# Patient Record
Sex: FEMALE | Race: WHITE | NOT HISPANIC OR LATINO | Employment: OTHER | URBAN - METROPOLITAN AREA
[De-identification: names, ages, dates, MRNs, and addresses within clinical notes are randomized per-mention and may not be internally consistent; named-entity substitution may affect disease eponyms.]

---

## 2017-03-06 ENCOUNTER — HISTORICAL (OUTPATIENT)
Dept: RADIOLOGY | Facility: HOSPITAL | Age: 77
End: 2017-03-06

## 2018-06-07 ENCOUNTER — HISTORICAL (OUTPATIENT)
Dept: RADIOLOGY | Facility: HOSPITAL | Age: 78
End: 2018-06-07

## 2018-10-23 ENCOUNTER — HISTORICAL (OUTPATIENT)
Dept: ADMINISTRATIVE | Facility: HOSPITAL | Age: 78
End: 2018-10-23

## 2018-10-23 LAB
ABS NEUT (OLG): 2.22 X10(3)/MCL (ref 2.1–9.2)
ALBUMIN SERPL-MCNC: 3.8 GM/DL (ref 3.4–5)
ALBUMIN/GLOB SERPL: 1 RATIO (ref 1–2)
ALP SERPL-CCNC: 78 UNIT/L (ref 45–117)
ALT SERPL-CCNC: 30 UNIT/L (ref 12–78)
AST SERPL-CCNC: 21 UNIT/L (ref 15–37)
BASOPHILS # BLD AUTO: 0.06 X10(3)/MCL
BASOPHILS NFR BLD AUTO: 2 %
BILIRUB SERPL-MCNC: 0.8 MG/DL (ref 0.2–1)
BILIRUBIN DIRECT+TOT PNL SERPL-MCNC: 0.2 MG/DL
BILIRUBIN DIRECT+TOT PNL SERPL-MCNC: 0.6 MG/DL
BUN SERPL-MCNC: 13 MG/DL (ref 7–18)
CALCIUM SERPL-MCNC: 9 MG/DL (ref 8.5–10.1)
CHLORIDE SERPL-SCNC: 105 MMOL/L (ref 98–107)
CO2 SERPL-SCNC: 27 MMOL/L (ref 21–32)
CREAT SERPL-MCNC: 0.7 MG/DL (ref 0.6–1.3)
EOSINOPHIL # BLD AUTO: 0.16 10*3/UL
EOSINOPHIL NFR BLD AUTO: 4 %
ERYTHROCYTE [DISTWIDTH] IN BLOOD BY AUTOMATED COUNT: 12.7 % (ref 11.5–14.5)
GLOBULIN SER-MCNC: 3.8 GM/ML (ref 2.3–3.5)
GLUCOSE SERPL-MCNC: 82 MG/DL (ref 74–106)
HCT VFR BLD AUTO: 41.8 % (ref 35–46)
HGB BLD-MCNC: 13.6 GM/DL (ref 12–16)
IMM GRANULOCYTES # BLD AUTO: 0.01 10*3/UL
IMM GRANULOCYTES NFR BLD AUTO: 0 %
LYMPHOCYTES # BLD AUTO: 1.01 X10(3)/MCL
LYMPHOCYTES NFR BLD AUTO: 26 % (ref 13–40)
MCH RBC QN AUTO: 29 PG (ref 26–34)
MCHC RBC AUTO-ENTMCNC: 32.5 GM/DL (ref 31–37)
MCV RBC AUTO: 89.1 FL (ref 80–100)
MONOCYTES # BLD AUTO: 0.43 X10(3)/MCL
MONOCYTES NFR BLD AUTO: 11 % (ref 4–12)
NEUTROPHILS # BLD AUTO: 2.22 X10(3)/MCL
NEUTROPHILS NFR BLD AUTO: 57 X10(3)/MCL
PLATELET # BLD AUTO: 223 X10(3)/MCL (ref 130–400)
PMV BLD AUTO: 10.4 FL (ref 7.4–10.4)
POTASSIUM SERPL-SCNC: 4.4 MMOL/L (ref 3.5–5.1)
PROT SERPL-MCNC: 7.6 GM/DL (ref 6.4–8.2)
RBC # BLD AUTO: 4.69 X10(6)/MCL (ref 4–5.2)
SODIUM SERPL-SCNC: 140 MMOL/L (ref 136–145)
TSH SERPL-ACNC: 2.02 MIU/L (ref 0.36–3.74)
WBC # SPEC AUTO: 3.9 X10(3)/MCL (ref 4.5–11)

## 2019-06-13 ENCOUNTER — HISTORICAL (OUTPATIENT)
Dept: RADIOLOGY | Facility: HOSPITAL | Age: 79
End: 2019-06-13

## 2019-10-17 ENCOUNTER — HISTORICAL (OUTPATIENT)
Dept: ADMINISTRATIVE | Facility: HOSPITAL | Age: 79
End: 2019-10-17

## 2019-10-17 LAB
ABS NEUT (OLG): 3.21 X10(3)/MCL (ref 2.1–9.2)
ALBUMIN SERPL-MCNC: 3.6 GM/DL (ref 3.4–5)
ALBUMIN/GLOB SERPL: 1 RATIO (ref 1.1–2)
ALP SERPL-CCNC: 80 UNIT/L (ref 45–117)
ALT SERPL-CCNC: 26 UNIT/L (ref 12–78)
AST SERPL-CCNC: 18 UNIT/L (ref 15–37)
BASOPHILS # BLD AUTO: 0 X10(3)/MCL (ref 0–0.2)
BASOPHILS NFR BLD AUTO: 1 %
BILIRUB SERPL-MCNC: 0.6 MG/DL (ref 0.2–1)
BILIRUBIN DIRECT+TOT PNL SERPL-MCNC: 0.2 MG/DL (ref 0–0.2)
BILIRUBIN DIRECT+TOT PNL SERPL-MCNC: 0.4 MG/DL
BUN SERPL-MCNC: 10 MG/DL (ref 7–18)
CALCIUM SERPL-MCNC: 9.2 MG/DL (ref 8.5–10.1)
CHLORIDE SERPL-SCNC: 105 MMOL/L (ref 98–107)
CHOLEST SERPL-MCNC: 151 MG/DL
CHOLEST/HDLC SERPL: 2.1 {RATIO} (ref 0–4.4)
CO2 SERPL-SCNC: 28 MMOL/L (ref 21–32)
CREAT SERPL-MCNC: 0.7 MG/DL (ref 0.6–1.3)
DEPRECATED CALCIDIOL+CALCIFEROL SERPL-MC: 31.35 NG/ML (ref 30–80)
EOSINOPHIL # BLD AUTO: 0.1 X10(3)/MCL (ref 0–0.9)
EOSINOPHIL NFR BLD AUTO: 2 %
ERYTHROCYTE [DISTWIDTH] IN BLOOD BY AUTOMATED COUNT: 12.9 % (ref 11.5–14.5)
GLOBULIN SER-MCNC: 3.7 GM/ML (ref 2.3–3.5)
GLUCOSE SERPL-MCNC: 82 MG/DL (ref 74–106)
HCT VFR BLD AUTO: 41 % (ref 35–46)
HDLC SERPL-MCNC: 71 MG/DL (ref 40–59)
HGB BLD-MCNC: 13.2 GM/DL (ref 12–16)
IMM GRANULOCYTES # BLD AUTO: 0.01 10*3/UL
IMM GRANULOCYTES NFR BLD AUTO: 0 %
LDLC SERPL CALC-MCNC: 44 MG/DL
LYMPHOCYTES # BLD AUTO: 0.8 X10(3)/MCL (ref 0.6–4.6)
LYMPHOCYTES NFR BLD AUTO: 18 %
MCH RBC QN AUTO: 29.3 PG (ref 26–34)
MCHC RBC AUTO-ENTMCNC: 32.2 GM/DL (ref 31–37)
MCV RBC AUTO: 90.9 FL (ref 80–100)
MONOCYTES # BLD AUTO: 0.4 X10(3)/MCL (ref 0.1–1.3)
MONOCYTES NFR BLD AUTO: 9 %
NEUTROPHILS # BLD AUTO: 3.21 X10(3)/MCL (ref 2.1–9.2)
NEUTROPHILS NFR BLD AUTO: 70 %
PLATELET # BLD AUTO: 213 X10(3)/MCL (ref 130–400)
PMV BLD AUTO: 10.2 FL (ref 7.4–10.4)
POTASSIUM SERPL-SCNC: 3.9 MMOL/L (ref 3.5–5.1)
PROT SERPL-MCNC: 7.3 GM/DL (ref 6.4–8.2)
RBC # BLD AUTO: 4.51 X10(6)/MCL (ref 4–5.2)
SODIUM SERPL-SCNC: 140 MMOL/L (ref 136–145)
TRIGL SERPL-MCNC: 179 MG/DL
TSH SERPL-ACNC: 1.34 MIU/L (ref 0.36–3.74)
VLDLC SERPL CALC-MCNC: 36 MG/DL
WBC # SPEC AUTO: 4.6 X10(3)/MCL (ref 4.5–11)

## 2020-09-23 ENCOUNTER — HISTORICAL (OUTPATIENT)
Dept: RADIOLOGY | Facility: HOSPITAL | Age: 80
End: 2020-09-23

## 2020-10-06 ENCOUNTER — HISTORICAL (OUTPATIENT)
Dept: ADMINISTRATIVE | Facility: HOSPITAL | Age: 80
End: 2020-10-06

## 2020-10-06 LAB
ABS NEUT (OLG): 2.26 X10(3)/MCL (ref 2.1–9.2)
ALBUMIN SERPL-MCNC: 3.6 GM/DL (ref 3.4–5)
ALBUMIN/GLOB SERPL: 1 RATIO (ref 1.1–2)
ALP SERPL-CCNC: 76 UNIT/L (ref 45–117)
ALT SERPL-CCNC: 30 UNIT/L (ref 12–78)
AST SERPL-CCNC: 16 UNIT/L (ref 15–37)
BASOPHILS # BLD AUTO: 0 X10(3)/MCL (ref 0–0.2)
BASOPHILS NFR BLD AUTO: 1 %
BILIRUB SERPL-MCNC: 0.7 MG/DL (ref 0.2–1)
BILIRUBIN DIRECT+TOT PNL SERPL-MCNC: 0.2 MG/DL (ref 0–0.2)
BILIRUBIN DIRECT+TOT PNL SERPL-MCNC: 0.5 MG/DL
BUN SERPL-MCNC: 11 MG/DL (ref 7–18)
CALCIUM SERPL-MCNC: 9.2 MG/DL (ref 8.5–10.1)
CHLORIDE SERPL-SCNC: 108 MMOL/L (ref 98–107)
CO2 SERPL-SCNC: 24 MMOL/L (ref 21–32)
CREAT SERPL-MCNC: 0.7 MG/DL (ref 0.6–1.3)
EOSINOPHIL # BLD AUTO: 0.1 X10(3)/MCL (ref 0–0.9)
EOSINOPHIL NFR BLD AUTO: 3 %
ERYTHROCYTE [DISTWIDTH] IN BLOOD BY AUTOMATED COUNT: 13.2 % (ref 11.5–14.5)
GLOBULIN SER-MCNC: 3.6 GM/ML (ref 2.3–3.5)
GLUCOSE SERPL-MCNC: 86 MG/DL (ref 74–106)
HCT VFR BLD AUTO: 40.2 % (ref 35–46)
HGB BLD-MCNC: 13.1 GM/DL (ref 12–16)
LYMPHOCYTES # BLD AUTO: 1.1 X10(3)/MCL (ref 0.6–4.6)
LYMPHOCYTES NFR BLD AUTO: 28 %
MCH RBC QN AUTO: 29.4 PG (ref 26–34)
MCHC RBC AUTO-ENTMCNC: 32.6 GM/DL (ref 31–37)
MCV RBC AUTO: 90.1 FL (ref 80–100)
MONOCYTES # BLD AUTO: 0.4 X10(3)/MCL (ref 0.1–1.3)
MONOCYTES NFR BLD AUTO: 11 %
NEUTROPHILS # BLD AUTO: 2.26 X10(3)/MCL (ref 2.1–9.2)
NEUTROPHILS NFR BLD AUTO: 56 %
PLATELET # BLD AUTO: 203 X10(3)/MCL (ref 130–400)
PMV BLD AUTO: 10.6 FL (ref 7.4–10.4)
POTASSIUM SERPL-SCNC: 4.1 MMOL/L (ref 3.5–5.1)
PROT SERPL-MCNC: 7.2 GM/DL (ref 6.4–8.2)
RBC # BLD AUTO: 4.46 X10(6)/MCL (ref 4–5.2)
SODIUM SERPL-SCNC: 142 MMOL/L (ref 136–145)
TSH SERPL-ACNC: 1.45 MIU/L (ref 0.36–3.74)
WBC # SPEC AUTO: 4 X10(3)/MCL (ref 4.5–11)

## 2021-06-23 ENCOUNTER — HISTORICAL (OUTPATIENT)
Dept: RADIOLOGY | Facility: HOSPITAL | Age: 81
End: 2021-06-23

## 2021-06-23 LAB
ABS NEUT (OLG): 2.92 X10(3)/MCL (ref 2.1–9.2)
ALBUMIN SERPL-MCNC: 3.6 GM/DL (ref 3.4–4.8)
ALBUMIN/GLOB SERPL: 1.2 RATIO (ref 1.1–2)
ALP SERPL-CCNC: 82 UNIT/L (ref 40–150)
ALT SERPL-CCNC: 20 UNIT/L (ref 0–55)
AST SERPL-CCNC: 19 UNIT/L (ref 5–34)
BASOPHILS # BLD AUTO: 0 X10(3)/MCL (ref 0–0.2)
BASOPHILS NFR BLD AUTO: 1 %
BILIRUB SERPL-MCNC: 0.7 MG/DL
BILIRUBIN DIRECT+TOT PNL SERPL-MCNC: 0.3 MG/DL (ref 0–0.5)
BILIRUBIN DIRECT+TOT PNL SERPL-MCNC: 0.4 MG/DL (ref 0–0.8)
BUN SERPL-MCNC: 15.7 MG/DL (ref 9.8–20.1)
CALCIUM SERPL-MCNC: 9.7 MG/DL (ref 8.4–10.2)
CHLORIDE SERPL-SCNC: 105 MMOL/L (ref 98–107)
CO2 SERPL-SCNC: 28 MMOL/L (ref 23–31)
CREAT SERPL-MCNC: 0.82 MG/DL (ref 0.55–1.02)
EOSINOPHIL # BLD AUTO: 0.2 X10(3)/MCL (ref 0–0.9)
EOSINOPHIL NFR BLD AUTO: 4 %
ERYTHROCYTE [DISTWIDTH] IN BLOOD BY AUTOMATED COUNT: 13.2 % (ref 11.5–14.5)
EST. AVERAGE GLUCOSE BLD GHB EST-MCNC: 114 MG/DL
GLOBULIN SER-MCNC: 3.1 GM/DL (ref 2.4–3.5)
GLUCOSE SERPL-MCNC: 80 MG/DL (ref 82–115)
HBA1C MFR BLD: 5.6 %
HCT VFR BLD AUTO: 39.9 % (ref 35–46)
HGB BLD-MCNC: 12.6 GM/DL (ref 12–16)
IMM GRANULOCYTES # BLD AUTO: 0.02 10*3/UL
IMM GRANULOCYTES NFR BLD AUTO: 0 %
LYMPHOCYTES # BLD AUTO: 1 X10(3)/MCL (ref 0.6–4.6)
LYMPHOCYTES NFR BLD AUTO: 21 %
MCH RBC QN AUTO: 28.6 PG (ref 26–34)
MCHC RBC AUTO-ENTMCNC: 31.6 GM/DL (ref 31–37)
MCV RBC AUTO: 90.7 FL (ref 80–100)
MONOCYTES # BLD AUTO: 0.6 X10(3)/MCL (ref 0.1–1.3)
MONOCYTES NFR BLD AUTO: 13 %
NEUTROPHILS # BLD AUTO: 2.92 X10(3)/MCL (ref 2.1–9.2)
NEUTROPHILS NFR BLD AUTO: 61 %
NRBC BLD AUTO-RTO: 0 % (ref 0–0.2)
PLATELET # BLD AUTO: 207 X10(3)/MCL (ref 130–400)
PMV BLD AUTO: 10.1 FL (ref 7.4–10.4)
POTASSIUM SERPL-SCNC: 4.2 MMOL/L (ref 3.5–5.1)
PROT SERPL-MCNC: 6.7 GM/DL (ref 5.8–7.6)
RBC # BLD AUTO: 4.4 X10(6)/MCL (ref 4–5.2)
SODIUM SERPL-SCNC: 141 MMOL/L (ref 136–145)
TSH SERPL-ACNC: 1.08 UIU/ML (ref 0.35–4.94)
WBC # SPEC AUTO: 4.8 X10(3)/MCL (ref 4.5–11)

## 2021-09-24 ENCOUNTER — HISTORICAL (OUTPATIENT)
Dept: RADIOLOGY | Facility: HOSPITAL | Age: 81
End: 2021-09-24

## 2021-12-07 ENCOUNTER — HISTORICAL (OUTPATIENT)
Dept: ENDOSCOPY | Facility: HOSPITAL | Age: 81
End: 2021-12-07

## 2022-04-07 ENCOUNTER — HISTORICAL (OUTPATIENT)
Dept: ADMINISTRATIVE | Facility: HOSPITAL | Age: 82
End: 2022-04-07
Payer: MEDICAID

## 2022-04-24 VITALS
DIASTOLIC BLOOD PRESSURE: 85 MMHG | WEIGHT: 164 LBS | BODY MASS INDEX: 30.96 KG/M2 | HEIGHT: 61 IN | SYSTOLIC BLOOD PRESSURE: 135 MMHG | OXYGEN SATURATION: 98 %

## 2022-04-30 NOTE — H&P
HISTORY OF PRESENT ILLNESS:  Ms. Brown is a pleasant 81-year-old female known to me from previous evaluation.  She has a personal history of adenomatous colon polyps and a family history of colon cancer in a maternal aunt and 2 maternal uncles.  She last underwent surveillance in August of 2016, and we had proposed a repeat at this juncture if her health was good.  She continues to be quite robust.    ALLERGIES:  None.    MEDICATIONS:  Limited to:  1. Levothyroxine.  2. Claritin.    PAST MEDICAL HISTORY:  Diverticular disease, adenomatous colon polyps, osteoarthritis, 2 C-sections, a partial hysterectomy, tonsillectomy, foot surgery, appendectomy, and hypothyroidism.    SOCIAL HISTORY:  A , her  succumbed to lung cancer and cardiomyopathy.  She has 3 healthy daughters.  She smoked long ago, she is a nondrinker who continues to exercise regularly with water aerobics.    FAMILY HISTORY:  As detailed, her mother succumbed to renal failure at 76, her father to a stroke at 47.    PHYSICAL EXAMINATION:  VITAL SIGNS:  Finds her afebrile, blood pressure 139/88, pulse 78, respirations 17.   HEART:  Regular.   LUNGS:  Clear.   ABDOMEN:  Obese, soft, nontender without organomegaly or mass.   NEUROLOGIC:  Alert and oriented.  Motor grossly intact.      DISCUSSION:  An 81-year-old patient with a personal history of adenomatous colon polyps and a family history of colon cancer.  I am hopeful we can reassure her at today's exam and leave well enough alone.  She understands that her children need screening.        ______________________________  MD JAYLEN Marroquin/  DD:  12/07/2021  Time:  07:46AM  DT:  12/07/2021  Time:  08:31AM  Job #:  [id number]    The H&P was reviewed, the patient was examined, and the following changes to the patients condition are  noted:  ______________________________________________________________________________  ______________________________________________________________________________  ______________________________________________________________________________  [  ] No changes to the patient's condition:      ______________________________                                             ___________________  PHYSICIAN SIGNATURE                                                             DATE/TIME    cc: MD Camron Quevedo MD

## 2022-04-30 NOTE — OP NOTE
DATE OF SURGERY:    12/07/2021    SURGEON:  Camron Laird MD    PROCEDURE:  Colonoscopy and snare and biopsy polypectomy.    PREOPERATIVE DIAGNOSES:  Personal history of adenomatous colon polyps, family history of colon cancer.    POSTOPERATIVE DIAGNOSES:    1. Adequate sedation with Diprivan per Anesthesia.  2. Good colon prep with successful examination to the cecal pole, identifying the appendiceal orifice and ileocecal valve.  3. Significant sigmoid diverticular disease with moderate tortuosity.  4. Lipomatous ileocecal valve.  5. Two polyps addressed, a 3 mm ascending colon polyp with the forceps, and a 4 or 5 mm descending colon polyp at 50 cm addressed in a piecemeal fashion with the snare, both submitted for histopathology.    PROCEDURE IN DETAIL:  The patient consented for the procedure after a detailed explanation of the risks and complications including bleeding, perforation and adverse reaction to sedation.  The patient was placed in the left lateral decubitus position.  Initially we examined the perineum and performed a digital exam.     The video colonoscope was inserted in the rectal vault and passed under direct vision.  We retroflexed to allow visualization of the anal verge and then de-retroflexed, traversing the sigmoid colon, descending colon and splenic flexure, transverse colon, hepatic flexure, ascending colon and entering the cecum.  The cecum was identified by visualizing the appendiceal orifice and the ileocecal valve.  The scope was withdrawn with reexamination of the mucosa and with biopsies, polypectomies and specimens removed as outlined below.     The scope was removed in its entirety and the patient tolerated the procedure well.     The patient's findings are as detailed.  She sedated nicely, had an adequate prep, and had a tortuous diverticulated sigmoid.  We did see through to the cecal pole identifying landmarks.  She had a lipomatous ileocecal valve.  She had 2 polyps  addressed on withdrawal, a tiny ascending colon polyp, and a 4 or 5 mm descending colon polyp.    DISCUSSION AND DISCHARGE SUMMARY:  After the procedure was completed, we discussed our findings with the patient.  We examined her and found her stable for discharge.  We allowed that she could resume her usual diet today and activities tomorrow.     Given her years, this will serve as her last routine exam.  Her children should be screened at 45.  She will call us on an as-needed basis down the line.        ______________________________  MD JAYLEN Marroquin/  DD:  12/07/2021  Time:  08:32AM  DT:  12/07/2021  Time:  08:45AM  Job #:  [id number]    cc: MD Camron Quevedo MD

## 2022-09-16 DIAGNOSIS — Z12.31 VISIT FOR SCREENING MAMMOGRAM: Primary | ICD-10-CM

## 2022-09-20 ENCOUNTER — TELEPHONE (OUTPATIENT)
Dept: FAMILY MEDICINE | Facility: CLINIC | Age: 82
End: 2022-09-20
Payer: MEDICARE

## 2022-09-20 DIAGNOSIS — Z12.31 VISIT FOR SCREENING MAMMOGRAM: Primary | ICD-10-CM

## 2022-09-20 NOTE — TELEPHONE ENCOUNTER
Mammogram ordered  LG      ----- Message from Leanna Jansen LPN sent at 9/19/2022  9:49 AM CDT -----  Yes please she does need an order.  ----- Message -----  From: Cathi Pope MD  Sent: 9/15/2022   4:31 PM CDT  To: Leanna Jansen LPN    Can you please call to see if patient needs an order?  Thank you   LG    ----- Message -----  From: Cherise Alcala  Sent: 9/15/2022  11:31 AM CDT  To: Cathi Pope MD    Good Morning,    Pt would like a call back concerning an appointment for a mammogram.    Thank You

## 2022-10-26 ENCOUNTER — HOSPITAL ENCOUNTER (OUTPATIENT)
Dept: RADIOLOGY | Facility: HOSPITAL | Age: 82
Discharge: HOME | End: 2022-10-26
Attending: FAMILY MEDICINE
Payer: MEDICARE

## 2022-10-26 DIAGNOSIS — Z12.31 VISIT FOR SCREENING MAMMOGRAM: ICD-10-CM

## 2022-10-26 PROCEDURE — 77067 MAMMO DIGITAL SCREENING BILAT WITH TOMO: ICD-10-PCS | Mod: 26,,, | Performed by: STUDENT IN AN ORGANIZED HEALTH CARE EDUCATION/TRAINING PROGRAM

## 2022-10-26 PROCEDURE — 77067 SCR MAMMO BI INCL CAD: CPT | Mod: TC

## 2022-10-26 PROCEDURE — 77063 MAMMO DIGITAL SCREENING BILAT WITH TOMO: ICD-10-PCS | Mod: 26,,, | Performed by: STUDENT IN AN ORGANIZED HEALTH CARE EDUCATION/TRAINING PROGRAM

## 2022-10-26 PROCEDURE — 77067 SCR MAMMO BI INCL CAD: CPT | Mod: 26,,, | Performed by: STUDENT IN AN ORGANIZED HEALTH CARE EDUCATION/TRAINING PROGRAM

## 2022-10-26 PROCEDURE — 77063 BREAST TOMOSYNTHESIS BI: CPT | Mod: 26,,, | Performed by: STUDENT IN AN ORGANIZED HEALTH CARE EDUCATION/TRAINING PROGRAM

## 2023-01-31 RX ORDER — FLUTICASONE PROPIONATE 50 MCG
1 SPRAY, SUSPENSION (ML) NASAL 2 TIMES DAILY
COMMUNITY
Start: 2022-09-15 | End: 2023-04-21 | Stop reason: SDUPTHER

## 2023-01-31 RX ORDER — LEVOTHYROXINE SODIUM 75 UG/1
1 TABLET ORAL DAILY
COMMUNITY
Start: 2022-09-15 | End: 2023-01-31 | Stop reason: SDUPTHER

## 2023-01-31 RX ORDER — LEVOTHYROXINE SODIUM 75 UG/1
75 TABLET ORAL DAILY
Qty: 90 TABLET | Refills: 3 | Status: SHIPPED | OUTPATIENT
Start: 2023-01-31 | End: 2023-04-21 | Stop reason: SDUPTHER

## 2023-04-21 ENCOUNTER — OFFICE VISIT (OUTPATIENT)
Dept: FAMILY MEDICINE | Facility: CLINIC | Age: 83
End: 2023-04-21
Payer: MEDICARE

## 2023-04-21 VITALS
WEIGHT: 170 LBS | HEART RATE: 95 BPM | SYSTOLIC BLOOD PRESSURE: 124 MMHG | RESPIRATION RATE: 18 BRPM | TEMPERATURE: 98 F | HEIGHT: 61 IN | BODY MASS INDEX: 32.1 KG/M2 | OXYGEN SATURATION: 96 % | DIASTOLIC BLOOD PRESSURE: 72 MMHG

## 2023-04-21 DIAGNOSIS — J30.2 SEASONAL ALLERGIES: ICD-10-CM

## 2023-04-21 DIAGNOSIS — E03.9 HYPOTHYROIDISM, UNSPECIFIED TYPE: Primary | ICD-10-CM

## 2023-04-21 LAB
ALBUMIN SERPL-MCNC: 3.6 G/DL (ref 3.4–4.8)
ALBUMIN/GLOB SERPL: 1.2 RATIO (ref 1.1–2)
ALP SERPL-CCNC: 79 UNIT/L (ref 40–150)
ALT SERPL-CCNC: 23 UNIT/L (ref 0–55)
AST SERPL-CCNC: 20 UNIT/L (ref 5–34)
BASOPHILS # BLD AUTO: 0.03 X10(3)/MCL (ref 0–0.2)
BASOPHILS NFR BLD AUTO: 0.5 %
BILIRUBIN DIRECT+TOT PNL SERPL-MCNC: 0.5 MG/DL
BUN SERPL-MCNC: 14.9 MG/DL (ref 9.8–20.1)
CALCIUM SERPL-MCNC: 9.2 MG/DL (ref 8.4–10.2)
CHLORIDE SERPL-SCNC: 109 MMOL/L (ref 98–107)
CO2 SERPL-SCNC: 25 MMOL/L (ref 23–31)
CREAT SERPL-MCNC: 0.88 MG/DL (ref 0.55–1.02)
EOSINOPHIL # BLD AUTO: 0.19 X10(3)/MCL (ref 0–0.9)
EOSINOPHIL NFR BLD AUTO: 3.3 %
ERYTHROCYTE [DISTWIDTH] IN BLOOD BY AUTOMATED COUNT: 13 % (ref 11.5–17)
GFR SERPLBLD CREATININE-BSD FMLA CKD-EPI: >60 MLS/MIN/1.73/M2
GLOBULIN SER-MCNC: 3.1 GM/DL (ref 2.4–3.5)
GLUCOSE SERPL-MCNC: 92 MG/DL (ref 82–115)
HCT VFR BLD AUTO: 38.5 % (ref 37–47)
HGB BLD-MCNC: 12.6 G/DL (ref 12–16)
IMM GRANULOCYTES # BLD AUTO: 0.01 X10(3)/MCL (ref 0–0.04)
IMM GRANULOCYTES NFR BLD AUTO: 0.2 %
LYMPHOCYTES # BLD AUTO: 0.84 X10(3)/MCL (ref 0.6–4.6)
LYMPHOCYTES NFR BLD AUTO: 14.5 %
MCH RBC QN AUTO: 29.2 PG (ref 27–31)
MCHC RBC AUTO-ENTMCNC: 32.7 G/DL (ref 33–36)
MCV RBC AUTO: 89.3 FL (ref 80–94)
MONOCYTES # BLD AUTO: 0.56 X10(3)/MCL (ref 0.1–1.3)
MONOCYTES NFR BLD AUTO: 9.7 %
NEUTROPHILS # BLD AUTO: 4.15 X10(3)/MCL (ref 2.1–9.2)
NEUTROPHILS NFR BLD AUTO: 71.8 %
NRBC BLD AUTO-RTO: 0 %
PLATELET # BLD AUTO: 205 X10(3)/MCL (ref 130–400)
PMV BLD AUTO: 10.8 FL (ref 7.4–10.4)
POTASSIUM SERPL-SCNC: 4 MMOL/L (ref 3.5–5.1)
PROT SERPL-MCNC: 6.7 GM/DL (ref 5.8–7.6)
RBC # BLD AUTO: 4.31 X10(6)/MCL (ref 4.2–5.4)
SODIUM SERPL-SCNC: 142 MMOL/L (ref 136–145)
TSH SERPL-ACNC: 1.93 UIU/ML (ref 0.35–4.94)
WBC # SPEC AUTO: 5.8 X10(3)/MCL (ref 4.5–11.5)

## 2023-04-21 PROCEDURE — 80053 COMPREHEN METABOLIC PANEL: CPT | Performed by: FAMILY MEDICINE

## 2023-04-21 PROCEDURE — 36415 COLL VENOUS BLD VENIPUNCTURE: CPT | Performed by: FAMILY MEDICINE

## 2023-04-21 PROCEDURE — 85025 COMPLETE CBC W/AUTO DIFF WBC: CPT | Performed by: FAMILY MEDICINE

## 2023-04-21 PROCEDURE — 99214 OFFICE O/P EST MOD 30 MIN: CPT | Mod: PBBFAC | Performed by: FAMILY MEDICINE

## 2023-04-21 PROCEDURE — 84443 ASSAY THYROID STIM HORMONE: CPT | Performed by: FAMILY MEDICINE

## 2023-04-21 RX ORDER — MONTELUKAST SODIUM 10 MG/1
10 TABLET ORAL NIGHTLY
Qty: 90 TABLET | Refills: 3 | Status: SHIPPED | OUTPATIENT
Start: 2023-04-21 | End: 2023-05-21

## 2023-04-21 RX ORDER — LEVOTHYROXINE SODIUM 75 UG/1
75 TABLET ORAL DAILY
Qty: 90 TABLET | Refills: 3 | Status: SHIPPED | OUTPATIENT
Start: 2023-04-21

## 2023-04-21 RX ORDER — FLUTICASONE PROPIONATE 50 MCG
1 SPRAY, SUSPENSION (ML) NASAL 2 TIMES DAILY
Qty: 16 G | Refills: 11 | Status: SHIPPED | OUTPATIENT
Start: 2023-04-21 | End: 2024-02-15 | Stop reason: SDUPTHER

## 2023-04-21 NOTE — PROGRESS NOTES
Subjective:       Patient ID: Piper Brown is a 82 y.o. female.    Chief Complaint: Follow-up (Routine clinic visit) and Sinusitis    Follow-up  Pertinent negatives include no arthralgias, chest pain, headaches, joint swelling, neck pain, vomiting or weakness.   Sinusitis  Pertinent negatives include no headaches or neck pain.     83 yo for follow up    Seasonal allergies- taking claritin and flonase. Still has symptoms. Started singulair last year but did not continue it. Reports congestion as the cause of wheezing reported by patient in ROS (No problems breathing)     Hypothyroidism- TSH June 2021 WNL. Compliant with medication     Activity change mentioned by patient is that she has not been able to do her regular water aerobics due to the location closing. States that she will resume water aerobics in daughter's pool when it warms up. She is riding her stationary bike for exercise.     Reports occasional swelling in right ankle and some pins/needles sensation in her feet at night. Pt attributes it to not being able to do her water aerobics. Does not want any further workup or evaluation/treatment at this time    Pt recently got hearing aids and is doing great    colonoscopy Dec 2021  Mammogram Oct 2022 BIRADS 1  DEXA 2021 normal bone density     Tdap 2018   Declines vaccination for shingles.  Declines flu vaccine  She thinks she had pneumonia vaccinations with Dr Aminata Quinteros    Review of Systems   Constitutional:  Positive for activity change. Negative for unexpected weight change.   HENT:  Positive for hearing loss and rhinorrhea. Negative for trouble swallowing.    Eyes:  Negative for discharge and visual disturbance.   Respiratory:  Positive for wheezing. Negative for chest tightness.    Cardiovascular:  Negative for chest pain and palpitations.   Gastrointestinal:  Negative for blood in stool, constipation, diarrhea and vomiting.   Endocrine: Negative for polydipsia and polyuria.   Genitourinary:   Negative for difficulty urinating, dysuria, hematuria and menstrual problem.   Musculoskeletal:  Negative for arthralgias, joint swelling and neck pain.   Neurological:  Negative for weakness and headaches.   Psychiatric/Behavioral:  Negative for confusion and dysphoric mood.             Objective:      Vitals:    04/21/23 0854   BP: 124/72   Pulse: 95   Resp: 18   Temp: 98.1 °F (36.7 °C)       Physical Exam    General: pleasant, well developed, in no acute distress  Head: normocephalic, atraumatic  Skin: warm and dry  Heart: regular rate and rhythm, S1S2 normal, no murmurs, rubs, or gallops  Lungs: clear to auscultation bilaterally, no wheezes  Abdomen: bowel sounds present, soft, nontender  Extremities: no edema  Neurological: nonfocal, alert and oriented, cooperative with exam  Psych: judgement and insight good, though process logical, goal directed    Assessment/Plan:  Hypothyroidism, unspecified type  -     Comprehensive Metabolic Panel; Future; Expected date: 04/21/2023  -     TSH; Future; Expected date: 04/21/2023  -     CBC Auto Differential; Future; Expected date: 04/21/2023  - adjust dose of medication if needed    Seasonal allergies  - restart singulair and continue flonase/claritin    Other orders  -     fluticasone propionate (FLONASE) 50 mcg/actuation nasal spray; 1 spray (50 mcg total) by Each Nostril route 2 (two) times a day.  Dispense: 16 g; Refill: 11  -     montelukast (SINGULAIR) 10 mg tablet; Take 1 tablet (10 mg total) by mouth every evening.  Dispense: 90 tablet; Refill: 3  -     levothyroxine (SYNTHROID) 75 MCG tablet; Take 1 tablet (75 mcg total) by mouth once daily.  Dispense: 90 tablet; Refill: 3    Continue physical activity    Notify clinic for continued or worsening ankle/foot pain or concerns     Follow up in about 1 year (around 4/21/2024).

## 2023-10-05 ENCOUNTER — TELEPHONE (OUTPATIENT)
Dept: FAMILY MEDICINE | Facility: CLINIC | Age: 83
End: 2023-10-05
Payer: MEDICARE

## 2023-10-05 DIAGNOSIS — K40.91 RECURRENT INGUINAL HERNIA WITHOUT OBSTRUCTION OR GANGRENE, UNSPECIFIED LATERALITY: Primary | ICD-10-CM

## 2023-10-05 NOTE — TELEPHONE ENCOUNTER
Pt requesting referral to Dr. Justin Trinidad - fax # [phone].  She saw him in the past for an inguinal hernia, but was told at the time to just monitor and if it worsens to see him again. She said it has gotten worse at this time. She also attempted to make an appointment with him, but was told she needed to get a new referral first.

## 2023-10-09 ENCOUNTER — HOSPITAL ENCOUNTER (EMERGENCY)
Facility: HOSPITAL | Age: 83
Discharge: HOME | End: 2023-10-09
Attending: EMERGENCY MEDICINE
Payer: MEDICARE

## 2023-10-09 VITALS
TEMPERATURE: 98 F | RESPIRATION RATE: 18 BRPM | HEIGHT: 61 IN | HEART RATE: 105 BPM | WEIGHT: 160 LBS | BODY MASS INDEX: 30.21 KG/M2 | DIASTOLIC BLOOD PRESSURE: 89 MMHG | SYSTOLIC BLOOD PRESSURE: 144 MMHG | OXYGEN SATURATION: 95 %

## 2023-10-09 DIAGNOSIS — S02.2XXA CLOSED FRACTURE OF NASAL BONE, INITIAL ENCOUNTER: ICD-10-CM

## 2023-10-09 DIAGNOSIS — S80.01XA CONTUSION OF RIGHT KNEE, INITIAL ENCOUNTER: ICD-10-CM

## 2023-10-09 DIAGNOSIS — S09.90XA INJURY OF HEAD, INITIAL ENCOUNTER: Primary | ICD-10-CM

## 2023-10-09 DIAGNOSIS — S00.83XA CONTUSION OF OTHER PART OF HEAD, INITIAL ENCOUNTER: ICD-10-CM

## 2023-10-09 PROCEDURE — 25000003 PHARM REV CODE 250: Mod: ER | Performed by: NURSE PRACTITIONER

## 2023-10-09 PROCEDURE — 99284 EMERGENCY DEPT VISIT MOD MDM: CPT | Mod: 25,ER

## 2023-10-09 RX ORDER — HYDROCODONE BITARTRATE AND ACETAMINOPHEN 5; 325 MG/1; MG/1
1 TABLET ORAL EVERY 6 HOURS PRN
Qty: 12 TABLET | Refills: 0 | Status: SHIPPED | OUTPATIENT
Start: 2023-10-09 | End: 2024-01-02

## 2023-10-09 RX ORDER — HYDROCODONE BITARTRATE AND ACETAMINOPHEN 5; 325 MG/1; MG/1
1 TABLET ORAL EVERY 6 HOURS PRN
Qty: 12 TABLET | Refills: 0 | Status: SHIPPED | OUTPATIENT
Start: 2023-10-09 | End: 2023-10-09 | Stop reason: SDUPTHER

## 2023-10-09 RX ORDER — BACITRACIN 500 [USP'U]/G
OINTMENT TOPICAL
Status: COMPLETED | OUTPATIENT
Start: 2023-10-09 | End: 2023-10-09

## 2023-10-09 RX ORDER — BACITRACIN ZINC 500 UNIT/G
OINTMENT (GRAM) TOPICAL 2 TIMES DAILY
Qty: 30 G | Refills: 0 | Status: SHIPPED | OUTPATIENT
Start: 2023-10-09

## 2023-10-09 RX ADMIN — BACITRACIN: 500 OINTMENT TOPICAL at 04:10

## 2023-10-09 NOTE — DISCHARGE INSTRUCTIONS
Antibiotic ointment to abrasions.  You have been given a medication that causes drowsiness.  Do not operate motor vehicles, drink alcohol, or operate heavy machinery while taking this medication. Return to the Emergency Department for any worsening, change in condition, or any emergent concerns.

## 2023-10-09 NOTE — ED PROVIDER NOTES
"Encounter Date: 10/9/2023       History     Chief Complaint   Patient presents with    Head Injury     An 82 y/o female presents to the ER c/o head injury and possible fx nose. Pt reports slipping and falling on the curve. Pt states "I heard the crack in my nose." Pt denies weakness, dizziness, or LOC. +Hematoma to forehead.      Plan: Fall    History of present illness: Patient is a 83-year-old female who was walking when she tripped over a curb falling onto her left knee and face.  She denies having lost consciousness has had no nausea or vomiting since the event.  She believes her nose is broken because she heard a crack and had some epistaxis which has self resolved.  She also has a large hematoma to the right frontal area.  She denies any issues with breathing tolerating secretions or        Review of patient's allergies indicates:  No Known Allergies  No past medical history on file.  No past surgical history on file.  No family history on file.  Social History     Tobacco Use    Smoking status: Never     Passive exposure: Past    Smokeless tobacco: Never   Substance Use Topics    Alcohol use: Not Currently    Drug use: Never     Review of Systems   Constitutional:  Negative for chills, fatigue and fever.   HENT:  Negative for congestion, ear discharge, ear pain, postnasal drip, rhinorrhea, sinus pressure, sneezing, sore throat and voice change.    Eyes:  Negative for discharge and itching.   Respiratory:  Negative for cough, shortness of breath and wheezing.    Cardiovascular:  Negative for chest pain, palpitations and leg swelling.   Gastrointestinal:  Negative for abdominal pain, constipation, diarrhea, nausea and vomiting.   Endocrine: Negative for polydipsia, polyphagia and polyuria.   Genitourinary:  Negative for dysuria, frequency, hematuria, urgency, vaginal bleeding, vaginal discharge and vaginal pain.   Musculoskeletal:  Positive for arthralgias. Negative for myalgias.   Skin:  Positive for color " change and wound. Negative for rash.   Neurological:  Negative for dizziness, seizures, syncope, weakness and numbness.   Hematological:  Negative for adenopathy. Does not bruise/bleed easily.   Psychiatric/Behavioral:  Negative for self-injury and suicidal ideas. The patient is not nervous/anxious.        Physical Exam     Initial Vitals [10/09/23 1441]   BP Pulse Resp Temp SpO2   (!) 144/89 105 18 97.8 °F (36.6 °C) 95 %      MAP       --         Physical Exam    Nursing note and vitals reviewed.  Constitutional: She appears well-developed and well-nourished.   HENT:   Head: Normocephalic. Not macrocephalic and not microcephalic. Head is with abrasion and with contusion. Head is without raccoon's eyes, without Carter's sign, without laceration, without right periorbital erythema and without left periorbital erythema. Hair is normal.       Right Ear: External ear normal.   Left Ear: External ear normal.   Nose: Sinus tenderness present. No mucosal edema, rhinorrhea, nose lacerations, nasal deformity, septal deviation or nasal septal hematoma. No epistaxis.  No foreign bodies. Right sinus exhibits no maxillary sinus tenderness and no frontal sinus tenderness. Left sinus exhibits no maxillary sinus tenderness and no frontal sinus tenderness.   Eyes: Conjunctivae, EOM and lids are normal. Pupils are equal, round, and reactive to light. Lids are everted and swept, no foreign bodies found. Right eye exhibits no discharge. Left eye exhibits no discharge.   Neck:   Normal range of motion.  Abdominal: She exhibits no distension.   Musculoskeletal:         General: Normal range of motion.      Cervical back: Normal range of motion.      Left knee: Normal.      Comments: Left knee abrasion     Neurological: She is alert and oriented to person, place, and time.   Skin: Skin is dry. Capillary refill takes less than 2 seconds.         ED Course   Procedures  Labs Reviewed - No data to display       Imaging Results               CT Cervical Spine Without Contrast (Final result)  Result time 10/09/23 15:35:03      Final result by Damon Barnes MD (10/09/23 15:35:03)                   Impression:      1. No acute abnormality  2. Multilevel chronic degenerative changes.      Electronically signed by: Damon Barnes  Date:    10/09/2023  Time:    15:35               Narrative:    EXAMINATION:  CT CERVICAL SPINE WITHOUT CONTRAST    CLINICAL HISTORY:  Neck trauma (Age >= 65y);    TECHNIQUE:  Low dose axial CT images through the cervical spine, with sagittal and coronal reformations.  Contrast was not administered.    COMPARISON:  None    FINDINGS:  No acute fractures of the cervical spine.  Grade 1 spondylolisthesis of C3 on C4.    Severe disc space narrowing at C4-5 with moderate disc space narrowing at C5-6 and C6-7.  Mild disc space narrowing elsewhere.    Multilevel mild facet arthropathy bilaterally.    Mild diffuse posterior disc osteophyte complex at C4-5, C5-6, C3-4 and C6-7.    Mild central canal narrowing at C3-4.    Severe foraminal narrowing at C3-4 bilaterally and C5-6 on the right    Limited evaluation of the intraspinal contents demonstrates no hematoma or mass.Paraspinal soft tissues exhibit no acute abnormalities.                                       CT Maxillofacial Without Contrast (Final result)  Result time 10/09/23 15:17:32      Final result by Damon Barnes MD (10/09/23 15:17:32)                   Impression:      Bilateral nasal fractures with minimal displacement are age indeterminate.  Recommend clinical correlation.      Electronically signed by: Damon Barnes  Date:    10/09/2023  Time:    15:17               Narrative:    EXAMINATION:  CT MAXILLOFACIAL WITHOUT CONTRAST    CLINICAL HISTORY:  Facial trauma, blunt;    TECHNIQUE:  Low dose axial images, sagittal and coronal reformations were obtained through the face.  Contrast was not administered.    COMPARISON:  None    FINDINGS:  The orbits are intact.   Zygomatic arches are intact.  The mandible is intact.    Nasal fractures bilaterally with minimal displacement is age indeterminate.  Recommend clinical correlation.    Mild probable chronic nasal septal deviation to the right.    Paranasal sinuses and mastoid air cells are clear.    Globes are intact.    Scalp hematoma anteriorly on the right.    No soft tissue mass or fluid collection of the face.                                       CT Head Without Contrast (Final result)  Result time 10/09/23 15:14:33      Final result by Damon Barnes MD (10/09/23 15:14:33)                   Impression:      1. No acute intracranial process.  2. Involutional changes with chronic microvascular ischemic changes.  Small remote lacunar infarct of the right lateral basal ganglia.  3. Scalp hematoma anteriorly on the right.  4. Small bilateral nasal fractures are age indeterminate.  Recommend clinical correlation.      Electronically signed by: Damon Barnes  Date:    10/09/2023  Time:    15:14               Narrative:    EXAMINATION:  CT HEAD WITHOUT CONTRAST    CLINICAL HISTORY:  Head trauma, minor (Age >= 65y);    TECHNIQUE:  Low dose axial CT images obtained throughout the head without intravenous contrast. Sagittal and coronal reconstructions were performed.    COMPARISON:  None.    FINDINGS:  Intracranial compartment:    No midline shift or acute hydrocephalus.  No extra-axial blood or fluid collections.    Moderate involutional changes and chronic microvascular ischemic changes in the periventricular white matter.  Scalp hematoma anteriorly on the right.    Remote lacunar infarct of the right lateral basal ganglia.    No parenchymal mass, hemorrhage, edema or major vascular distribution infarct.    Skull/extracranial contents (limited evaluation): No fracture. Mastoid air cells and paranasal sinuses are essentially clear.    Small nasal fractures bilaterally are age indeterminate.                                      "  Medications   bacitracin ointment ( Topical (Top) Given 10/9/23 3312)     Medical Decision Making  83-year-old female who fell onto her face and left knee.  She is able to ambulate without difficulty or antalgic gait.  Distal P SM is intact.  There is a hematoma to the right frontal area that is boggy with overlying abrasion.  No redness warmth or exudate.  There is abrasion to the nose also with no redness warmth or exudate.  No septal hematoma is noted.  There is very mild deviation of the bridge of the nose with tenderness.  She did not lose consciousness, has had no repetitive nausea or vomiting.  Differential diagnosis includes nasal fracture or dislocation, septal hematoma, subdural hematoma, subarachnoid hemorrhage, vertebral fracture or subluxation.  The spine is without tenderness or step-offs.  Full range of motion of all noted extremities.    Problems Addressed:  Closed fracture of nasal bone, initial encounter: acute illness or injury     Details: Follow-up with ENT when swelling is reduced.  Contusion of other part of head, initial encounter: acute illness or injury     Details: Ice and pain medication with appropriate drowsy warning  Contusion of right knee, initial encounter: acute illness or injury     Details: Ace wrap applied by nursing staff.    Amount and/or Complexity of Data Reviewed  Radiology: ordered. Decision-making details documented in ED Course.    Risk  OTC drugs.  Prescription drug management.  Diagnosis or treatment significantly limited by social determinants of health.  Risk Details: Vital signs at the time of disposition were:  BP (!) 144/89 (BP Location: Right arm, Patient Position: Sitting)   Pulse 105   Temp 97.8 °F (36.6 °C) (Oral)   Resp 18   Ht 5' 1" (1.549 m)   Wt 72.6 kg (160 lb)   SpO2 95%   BMI 30.23 kg/m²       See AVS for additional recommendations. Medications listed herein were prescribed after reviewing the patient's allergies, medication list, history, most " recent laboratories as available.  Referrals below were provided after reviewing the patient's previous medical providers. She understands she  should return for any worsening or changes in condition.  Prior to discharge the patient was asked if she  had any additional concerns or complaints and she declined. The patient was given an opportunity to ask questions and all were answered to her satisfaction.                ED Course as of 10/09/23 1826   Mon Oct 09, 2023   1504 BP(!): 144/89 [VC]   1504 Temp: 97.8 °F (36.6 °C) [VC]   1504 Temp Source: Oral [VC]   1504 Pulse: 105 [VC]   1504 Resp: 18 [VC]   1504 SpO2: 95 % [VC]   1522 CT Maxillofacial Without Contrast  Bilateral nasal fractures with minimal displacement are age indeterminate.   [VC]   1522 CT Head Without Contrast  1. No acute intracranial process.  2. Involutional changes with chronic microvascular ischemic changes.  Small remote lacunar infarct of the right lateral basal ganglia.  3. Scalp hematoma anteriorly on the right.  4. Small bilateral nasal fractures are age indeterminate. [VC]   1546 CT Cervical Spine Without Contrast     1. No acute abnormality  2. Multilevel chronic degenerative changes. [VC]      ED Course User Index  [VC] Aidan Egan DNP                    Clinical Impression:   Final diagnoses:  [S09.90XA] Injury of head, initial encounter (Primary)  [S00.83XA] Contusion of other part of head, initial encounter  [S02.2XXA] Closed fracture of nasal bone, initial encounter  [S80.01XA] Contusion of right knee, initial encounter        ED Disposition Condition    Discharge Stable          ED Prescriptions       Medication Sig Dispense Start Date End Date Auth. Provider    HYDROcodone-acetaminophen (NORCO) 5-325 mg per tablet  (Status: Discontinued) Take 1 tablet by mouth every 6 (six) hours as needed for Pain. 12 tablet 10/9/2023 10/9/2023 Aidan Egan DNP    bacitracin 500 unit/gram Oint Apply topically 2 (two) times daily.  30 g 10/9/2023 -- Aidan Egan DNP    HYDROcodone-acetaminophen (NORCO) 5-325 mg per tablet Take 1 tablet by mouth every 6 (six) hours as needed for Pain. 12 tablet 10/9/2023 -- Aidan Egan DNP          Follow-up Information       Follow up With Specialties Details Why Contact Info    Going, Cathi FAIR MD Family Medicine Schedule an appointment as soon as possible for a visit   [address] [facility] [address]  [phone]      Jennifer Hwang MD Otolaryngology Schedule an appointment as soon as possible for a visit   [address] [place] [address]  [phone]               Aidan Egan DNP  10/09/23 9003

## 2023-10-11 ENCOUNTER — NURSE TRIAGE (OUTPATIENT)
Dept: ADMINISTRATIVE | Facility: CLINIC | Age: 83
End: 2023-10-11
Payer: MEDICARE

## 2023-10-11 ENCOUNTER — HOSPITAL ENCOUNTER (EMERGENCY)
Facility: HOSPITAL | Age: 83
Discharge: HOME | End: 2023-10-11
Attending: EMERGENCY MEDICINE
Payer: MEDICARE

## 2023-10-11 VITALS
TEMPERATURE: 98 F | SYSTOLIC BLOOD PRESSURE: 176 MMHG | WEIGHT: 165.13 LBS | HEART RATE: 109 BPM | BODY MASS INDEX: 31.18 KG/M2 | HEIGHT: 61 IN | DIASTOLIC BLOOD PRESSURE: 86 MMHG | OXYGEN SATURATION: 95 % | RESPIRATION RATE: 18 BRPM

## 2023-10-11 DIAGNOSIS — S00.83XA CONTUSION OF FACE, INITIAL ENCOUNTER: Primary | ICD-10-CM

## 2023-10-11 PROCEDURE — 99282 EMERGENCY DEPT VISIT SF MDM: CPT

## 2023-10-11 NOTE — TELEPHONE ENCOUNTER
"Patient states recent head injury due to a fall and visit to ED on 10/09/23. Patient states she was diagnosed with a nose fracture and advised to apply ice for the next 48 hours. Patient states swelling is resolving and notices a "clear" drainage on the inside of her face. Patient inquiring if this is a normal healing process.     Patient advised to Go to nearest Urgent Care Center for evaluation/treatment. Patient states understanding at this time.   Reason for Disposition   Nursing judgment    Additional Information   Negative: Sounds like a life-threatening emergency to the triager   Negative: Information only call about a Well Adult (no illness or injury)   Negative: Caller can't be reached by phone   Negative: Nursing judgment   Negative: Nursing judgment    Protocols used: No Protocol [id number]    "

## 2023-10-12 NOTE — TELEPHONE ENCOUNTER
"Pt fell on Monday, 2 days ago.  Pt started with nasal is dripping clear fluid that am.  Took sinus medicine this am and the leaking stopped and restarted this afternoon.  Pt has 2 black eyes/bruising.  Care advice state to go to ED now.  Family/pt verbally understood, all questions answered, advised to call back for any worsening symptoms or further needs.    Reason for Disposition   [1] One or two "black eyes" (bruising, purple color of eyelids) AND [2] onset within 24 hours of head injury    Additional Information   Negative: [1] ACUTE NEURO SYMPTOM AND [2] present now  (DEFINITION: difficult to awaken OR confused thinking and talking OR slurred speech OR weakness of arms OR unsteady walking)   Negative: Knocked out (unconscious) > 1 minute   Negative: Seizure (convulsion) occurred  (Exception: Prior history of seizures and now alert and without Acute Neuro Symptoms.)   Negative: Penetrating head injury (e.g., knife, gun shot wound, metal object)   Negative: [1] Major bleeding (e.g., actively dripping or spurting) AND [2] can't be stopped   Negative: [1] Dangerous mechanism of injury (e.g., MVA, diving, trampoline, contact sports, fall > 10 feet or 3 meters) AND [2] NECK pain AND [3] began < 1 hour after injury   Negative: Sounds like a life-threatening emergency to the triager   Negative: Can't remember what happened (amnesia)   Negative: Vomiting once or more   Negative: [1] Loss of vision or double vision AND [2] present now   Negative: Watery or blood-tinged fluid dripping from the NOSE or EARS now  (Exception: Tears from crying or nosebleed from nasal trauma.)    Protocols used: Head Injury-A-AH    "

## 2023-10-12 NOTE — ED PROVIDER NOTES
Encounter Date: 10/11/2023    SCRIBE #1 NOTE: I, Mariangel Sanchez, am scribing for, and in the presence of,  Yunior Lozoya MD. I have scribed the following portions of the note - Other sections scribed: HPI, ROS, PE.       History     Chief Complaint   Patient presents with    Facial Injury     Patient w/ fall on 10/9/23, trip and fall from standing landing on her face onto cement. Pt seen in ER and discharged w/ bilateral nasal fractures. Pt reports clear nasal drainage from left nostril, called nurse hotline and told to come to ER. Denies pain, no visual changes, no vomiting. Bruising noted to bilateral eyes and cheeks,PMH synthroid.     83 year old female presents to the ED for clear left nasal drainage since noon today. Pt had a fall on 10/9/23, she tripped and fell from standing landing on her face onto cement, denies blood thinners. She was seen in the ED and discharged with bilateral nasal fractures. Today, she presented with clear nasal drainage and called nurse hotline and told to come to ED. Denies headache, visual changes, fever, nausea, vomiting, and any other pain at this time. Obvious bruising noted to bilateral eyes and cheeks and contusion to right side of forehead. States she also sustained nasal bone fracture. She has a history of synthroid.    The history is provided by the patient. No  was used.     Review of patient's allergies indicates:  No Known Allergies  No past medical history on file.  No past surgical history on file.  No family history on file.  Social History     Tobacco Use    Smoking status: Never     Passive exposure: Past    Smokeless tobacco: Never   Substance Use Topics    Alcohol use: Not Currently    Drug use: Never     Review of Systems   Constitutional:  Negative for activity change, chills, diaphoresis, fatigue and fever.   HENT:  Negative for congestion, ear pain, sinus pain and sore throat.         Periorbital bruising to BL eyes and contusion to R  forehead  Clear nasal discharge to left nare   Eyes:  Negative for visual disturbance.   Respiratory:  Negative for cough, shortness of breath, wheezing and stridor.    Cardiovascular:  Negative for chest pain, palpitations and leg swelling.   Gastrointestinal:  Negative for abdominal pain, constipation, diarrhea, nausea, rectal pain and vomiting.   Genitourinary:  Negative for dysuria and hematuria.   Musculoskeletal:  Negative for arthralgias, back pain and myalgias.   Skin:  Negative for rash.   Neurological:  Negative for dizziness, syncope, weakness, numbness and headaches.   All other systems reviewed and are negative.      Physical Exam     Initial Vitals [10/11/23 2223]   BP Pulse Resp Temp SpO2   (!) 176/86 109 18 97.6 °F (36.4 °C) 95 %      MAP       --         Physical Exam    Nursing note and vitals reviewed.  Constitutional: She appears well-developed and well-nourished. No distress.   HENT:   Head: Head is with contusion.   Right Ear: Tympanic membrane and external ear normal. No hemotympanum.   Left Ear: Tympanic membrane and external ear normal. No hemotympanum.   Bilateral periorbital bruising  Contusion to right side of forehead  TM clear bilaterally, no hemotympanum  I had patient lean forward for a couple of minutes, there is no fluid leaking from the left nostril at this time.   Eyes: EOM are normal. Pupils are equal, round, and reactive to light.   Neck: Trachea normal. Neck supple.   C-spine nontender   Normal range of motion.  Cardiovascular:  Normal rate and regular rhythm.           No murmur heard.  Pulmonary/Chest: Breath sounds normal. No respiratory distress.   Abdominal: Abdomen is soft. Bowel sounds are normal. She exhibits no distension. There is no abdominal tenderness. There is no rebound and no guarding.   Musculoskeletal:         General: Normal range of motion.      Cervical back: Normal range of motion and neck supple. No muscular tenderness.      Lumbar back: Normal range of  motion.     Neurological: She is alert and oriented to person, place, and time. She has normal strength.   Skin: Skin is warm and dry. Bruising noted. No rash noted.   Psychiatric: She has a normal mood and affect.         ED Course   Procedures  Labs Reviewed - No data to display       Imaging Results    None          Medications - No data to display  Medical Decision Making  As per HPI.  Differential diagnosis:  Facial contusion, nasal fracture    Amount and/or Complexity of Data Reviewed  Discussion of management or test interpretation with external provider(s): Patient is status post fall on 10/09 2023.  Patient had CTs of the head, C-spine and face.  Patient was diagnosed with nasal fractures.  CT showed no acute changes on the CT of the brain.  Patient presents secondary to clear rhinorrhea from the left nostril.  Patient denies headache.  Patient denies any nausea vomiting.  Patient denies neck pain or focal weakness.  Patient will be discharged to home.              Scribe Attestation:   Scribe #1: I performed the above scribed service and the documentation accurately describes the services I performed. I attest to the accuracy of the note.    Attending Attestation:           Physician Attestation for Scribe:  Physician Attestation Statement for Scribe #1: I, Yunior Lozoya MD, reviewed documentation, as scribed by Mariangel Sanchez in my presence, and it is both accurate and complete.                             Clinical Impression:   Final diagnoses:  [S00.83XA] Contusion of face, initial encounter (Primary)        ED Disposition Condition    Discharge Stable          ED Prescriptions    None       Follow-up Information       Follow up With Specialties Details Why Contact Info    [facility] - Emergency Dept Emergency Medicine  As needed, If symptoms worsen [address] [facility] [place] [id number]  [phone]             Yunior Lozoya MD  10/11/23 4243

## 2023-10-19 ENCOUNTER — OFFICE VISIT (OUTPATIENT)
Dept: FAMILY MEDICINE | Facility: CLINIC | Age: 83
End: 2023-10-19
Payer: MEDICARE

## 2023-10-19 VITALS
HEART RATE: 83 BPM | WEIGHT: 171.63 LBS | DIASTOLIC BLOOD PRESSURE: 88 MMHG | RESPIRATION RATE: 18 BRPM | TEMPERATURE: 98 F | BODY MASS INDEX: 32.4 KG/M2 | SYSTOLIC BLOOD PRESSURE: 154 MMHG | HEIGHT: 61 IN | OXYGEN SATURATION: 99 %

## 2023-10-19 DIAGNOSIS — S02.92XD CLOSED FRACTURE OF FACIAL BONE WITH ROUTINE HEALING, UNSPECIFIED FACIAL BONE, SUBSEQUENT ENCOUNTER: Primary | ICD-10-CM

## 2023-10-19 PROCEDURE — 99214 OFFICE O/P EST MOD 30 MIN: CPT | Mod: PBBFAC | Performed by: FAMILY MEDICINE

## 2023-10-19 NOTE — PROGRESS NOTES
"Subjective:       Patient ID: Piper Brown is a 83 y.o. female.    Chief Complaint: Follow-up (Recent fall with injury/Pt requesting referral to ENT)      Follow-up      84 yo female SP fall on 10/11/23.  Pt struck parking lot with face after tripping on a cement barrier.  Struck her face and sustained nasal and orbital bone fractures.  Seen in ED, treated and released.  Reports that pain has resolved.  She has sever bruising around her eyes, nose, and down bilateral cheeks.  Denies syncope, weakness, or other neurological deficits before and after fall.    Review of Systems   Musculoskeletal:         As above   Integumentary:         As above   Neurological:         As above         Objective:       Vital Signs  Temp: 97.7 °F (36.5 °C)  Temp Source: Oral  Pulse: 83  Resp: 18  SpO2: 99 %  BP: (!) 154/88  BP Location: Left arm  Patient Position: Sitting  Height and Weight  Height: 5' 1" (154.9 cm)  Weight: 77.8 kg (171 lb 9.6 oz)  BSA (Calculated - sq m): 1.83 sq meters  BMI (Calculated): 32.4  Weight in (lb) to have BMI = 25: 132]  Physical Exam  Constitutional:       Appearance: Normal appearance.   HENT:      Head: Normocephalic and atraumatic.   Eyes:      Extraocular Movements: Extraocular movements intact.      Conjunctiva/sclera: Conjunctivae normal.   Cardiovascular:      Rate and Rhythm: Normal rate and regular rhythm.      Heart sounds: Normal heart sounds.   Pulmonary:      Breath sounds: Normal breath sounds.   Skin:     Comments: Severe contusions around eyes, nose, maxillae, and mandibular areas.  Mild TTP on nasal bridge.    Neurological:      General: No focal deficit present.      Mental Status: She is alert.   Psychiatric:         Mood and Affect: Mood and affect normal.         Speech: Speech normal.         Behavior: Behavior normal. Behavior is cooperative.         Thought Content: Thought content does not include homicidal or suicidal ideation.         Assessment:       Problem List Items " Addressed This Visit    None  Visit Diagnoses       Closed fracture of facial bone with routine healing, unspecified facial bone, subsequent encounter    -  Primary    Relevant Orders    Ambulatory referral/consult to ENT            Plan:   ER precautions  FU with ENT  RTC in 3 months

## 2023-10-23 ENCOUNTER — TELEPHONE (OUTPATIENT)
Dept: FAMILY MEDICINE | Facility: CLINIC | Age: 83
End: 2023-10-23
Payer: MEDICARE

## 2023-10-23 DIAGNOSIS — Z78.0 POSTMENOPAUSAL ESTROGEN DEFICIENCY: Primary | ICD-10-CM

## 2023-10-23 DIAGNOSIS — Z12.31 ENCOUNTER FOR SCREENING MAMMOGRAM FOR MALIGNANT NEOPLASM OF BREAST: ICD-10-CM

## 2023-10-24 DIAGNOSIS — Z12.31 SCREENING MAMMOGRAM FOR HIGH-RISK PATIENT: Primary | ICD-10-CM

## 2023-10-25 ENCOUNTER — OFFICE VISIT (OUTPATIENT)
Dept: OTOLARYNGOLOGY | Facility: CLINIC | Age: 83
End: 2023-10-25
Payer: MEDICARE

## 2023-10-25 VITALS
BODY MASS INDEX: 32.32 KG/M2 | OXYGEN SATURATION: 99 % | TEMPERATURE: 97 F | RESPIRATION RATE: 20 BRPM | SYSTOLIC BLOOD PRESSURE: 161 MMHG | WEIGHT: 171.19 LBS | HEIGHT: 61 IN | HEART RATE: 86 BPM | DIASTOLIC BLOOD PRESSURE: 100 MMHG

## 2023-10-25 DIAGNOSIS — S02.2XXA CLOSED FRACTURE OF NASAL BONE, INITIAL ENCOUNTER: ICD-10-CM

## 2023-10-25 DIAGNOSIS — S02.92XD CLOSED FRACTURE OF FACIAL BONE WITH ROUTINE HEALING, UNSPECIFIED FACIAL BONE, SUBSEQUENT ENCOUNTER: ICD-10-CM

## 2023-10-25 PROCEDURE — 99214 OFFICE O/P EST MOD 30 MIN: CPT | Mod: PBBFAC | Performed by: STUDENT IN AN ORGANIZED HEALTH CARE EDUCATION/TRAINING PROGRAM

## 2023-10-25 NOTE — PROGRESS NOTES
I reviewed the history and physical exam with the resident.   I agree with findings and plan.    Felice Dunn M.D.

## 2023-10-25 NOTE — PROGRESS NOTES
[facility]  Otolaryngology Clinic Note    Piper BANERJEE Carrier  Encounter Date: 10/25/2023  YOB: 1940    Chief Complaint: Nasal bone fx     HPI: Piper BANERJEE Carrier is a 83 y.o. female who presents evaluation for nasal bone fracture.  She fell on 10/09.  Since then she has been following sinus precautions.  She denies epistaxis after the initial incident.  Patient denies any congestion or difficulty breathing.  Does not appreciate any nasal deformity, and she is not bothered by appearance.  She is not interested in surgery at this time.  She is not on any blood thinners.  Prior to nasal bone fractures she used nasal spray, Flonase daily        ROS:   General: Negative except per HPI  Skin: Denies rash, ulcer, or lesion.  Eyes: Denies vision changes or diplopia.  Ears: Negative except per HPI  Nose: Negative except per HPI  Throat/mouth: Negative except per HPI  Cardiovascular: Negative except per HPI  Respiratory: Negative except per HPI  Neck: Negative except per HPI  Endocrine: Negative except per HPI  Neurologic: Negative except per HPI    Other 10-point review of systems negative except per HPI      Review of patient's allergies indicates:  No Known Allergies    No past medical history on file.    No past surgical history on file.    Social History     Socioeconomic History    Marital status:    Tobacco Use    Smoking status: Never     Passive exposure: Past    Smokeless tobacco: Never   Substance and Sexual Activity    Alcohol use: Not Currently    Drug use: Never       No family history on file.    Outpatient Encounter Medications as of 10/25/2023   Medication Sig Dispense Refill    bacitracin 500 unit/gram Oint Apply topically 2 (two) times daily. (Patient not taking: Reported on 10/19/2023) 30 g 0    fluticasone propionate (FLONASE) 50 mcg/actuation nasal spray 1 spray (50 mcg total) by Each Nostril route 2 (two) times a day. 16 g 11    HYDROcodone-acetaminophen  "(NORCO) 5-325 mg per tablet Take 1 tablet by mouth every 6 (six) hours as needed for Pain. (Patient not taking: Reported on 10/19/2023) 12 tablet 0    levothyroxine (SYNTHROID) 75 MCG tablet Take 1 tablet (75 mcg total) by mouth once daily. 90 tablet 3    NON FORMULARY MEDICATION Take 1 Package by mouth 2 (two) times a day. [facility]       No facility-administered encounter medications on file as of 10/25/2023.       Physical Exam:  Vitals:    10/25/23 0732   BP: (!) 161/100   BP Location: Right arm   Patient Position: Sitting   BP Method: Medium (Automatic)   Pulse: 86   Resp: 20   Temp: 97.1 °F (36.2 °C)   TempSrc: Oral   SpO2: 99%   Weight: 77.7 kg (171 lb 3.2 oz)   Height: 5' 1" (1.549 m)       Constitutional  General Appearance: well nourished, well-developed, AAO x3, NAD  HEENT:  Patient has contusion along her right eyebrow, hematoma present, bruising present along malar regions bilaterally  Eyes: PEERLA, EOMI, normal conjunctivae  Ears: Hearing well at conversation level; anguiano - no lateralization, Rinne AC > BC   AD: auricle normal, EAC normal, TM intact, no JONATHAN   AS: auricle normal, EAC normal, TM intact, somewhat atelectatic with myringosclerosis   Vestibular: ambulates without gait disturbance  Nose: septum midline, no inferior turbinate hypertrophy, no polyps, no step-offs present  OC/OP: MMM  Nasopharynx, Hypopharynx, and Larynx:    Indirect: attempted, limited view due to patient intolerance  Neck: soft, non-tender, no palpable lymph nodes   Thyroid region- no nodules or goiter  Neuro: CN II - XII intact bilaterally  Cardiovascular: peripheral pulses palpable  Respiratory: non-labored respirations  Psychiatric: oriented to time, place and person, no depression, anxiety or agitation      Pertinent Data:  ? LABS:  ? AUDIO:  ? CT Scan:    Imaging:   I personally reviewed the following images:    Summary of Outside Records:      Assessment/Plan:  Piper BANERJEE Carrier is a 83 y.o. " female who presents with nasal bone fracture.  Not having any difficulty breathing and is not interested in surgical intervention at this time    - return to clinic p.r.n.  - can resume Flonase  - continue sinus precautions for a total 6-8 weeks      MARIA DE JESUS Araujo MD  [facility] Department of Otolaryngology  HO[facility]III        Answers submitted by the patient for this visit:  Review of Symptoms Questionnaire  (Submitted on 10/23/2023)  None of these: Yes  hearing loss: Yes  facial swelling: Yes  None of these : Yes  shortness of breath: Yes  None of these : Yes  None of these: Yes  Urinating too frequently?: Yes  Muscle aches / pain?: Yes  None of these : Yes  Seasonal Allergies?: Yes  None of these : Yes  headaches: Yes  None of these: Yes  None of these: Yes

## 2023-11-09 DIAGNOSIS — Z12.31 VISIT FOR SCREENING MAMMOGRAM: Primary | ICD-10-CM

## 2023-11-29 ENCOUNTER — HOSPITAL ENCOUNTER (OUTPATIENT)
Dept: RADIOLOGY | Facility: HOSPITAL | Age: 83
Discharge: HOME | End: 2023-11-29
Attending: FAMILY MEDICINE
Payer: MEDICARE

## 2023-11-29 DIAGNOSIS — Z12.31 VISIT FOR SCREENING MAMMOGRAM: ICD-10-CM

## 2023-11-29 PROCEDURE — 77067 SCR MAMMO BI INCL CAD: CPT | Mod: 26,,, | Performed by: RADIOLOGY

## 2023-11-29 PROCEDURE — 77063 BREAST TOMOSYNTHESIS BI: CPT | Mod: 26,,, | Performed by: RADIOLOGY

## 2023-11-29 PROCEDURE — 77067 MAMMO DIGITAL SCREENING BILAT WITH TOMO: ICD-10-PCS | Mod: 26,,, | Performed by: RADIOLOGY

## 2023-11-29 PROCEDURE — 77067 SCR MAMMO BI INCL CAD: CPT | Mod: TC

## 2023-11-29 PROCEDURE — 77063 MAMMO DIGITAL SCREENING BILAT WITH TOMO: ICD-10-PCS | Mod: 26,,, | Performed by: RADIOLOGY

## 2023-12-28 NOTE — PROGRESS NOTES
History & Physical    CHIEF COMPLAINT:  INGUINAL HERNIA     History of Present Illness:  83 year-old-male referred by Dr. Loredo for an epigastric ventral hernia.  She reports a bulge has been present intermittently for many years.  She recently was moving some heavy objects in noted some discomfort for the next couple of days at this site and she became concerned.  No nausea, vomiting or constipation symptoms.  No previous history of hernia surgery or abdominal surgery in this area.    Review of patient's allergies indicates:  No Known Allergies    Current Outpatient Medications   Medication Sig Dispense Refill    bacitracin 500 unit/gram Oint Apply topically 2 (two) times daily. (Patient not taking: Reported on 10/19/2023) 30 g 0    fluticasone propionate (FLONASE) 50 mcg/actuation nasal spray 1 spray (50 mcg total) by Each Nostril route 2 (two) times a day. 16 g 11    HYDROcodone-acetaminophen (NORCO) 5-325 mg per tablet Take 1 tablet by mouth every 6 (six) hours as needed for Pain. (Patient not taking: Reported on 10/19/2023) 12 tablet 0    levothyroxine (SYNTHROID) 75 MCG tablet Take 1 tablet (75 mcg total) by mouth once daily. 90 tablet 3    NON FORMULARY MEDICATION Take 1 Package by mouth 2 (two) times a day. [facility]       No current facility-administered medications for this visit.       No past medical history on file.  No past surgical history on file.  No family history on file.  Social History     Tobacco Use    Smoking status: Never     Passive exposure: Past    Smokeless tobacco: Never   Substance Use Topics    Alcohol use: Not Currently    Drug use: Never        Review of Systems:  Review of Systems   Constitutional:  Negative for appetite change, chills, diaphoresis and fever.   HENT:  Negative for congestion, drooling, ear discharge, ear pain and hearing loss.    Eyes:  Negative for discharge.   Respiratory:  Negative for apnea, cough, choking, chest tightness, shortness of  breath and stridor.    Cardiovascular:  Negative for chest pain, palpitations and leg swelling.   Endocrine: Negative for cold intolerance and heat intolerance.   Genitourinary:  Negative for difficulty urinating, dyspareunia, dysuria and hematuria.   Musculoskeletal:  Negative for arthralgias, gait problem and joint swelling.   Skin:  Negative for color change and rash.   Neurological:  Negative for dizziness, tremors, seizures, syncope, facial asymmetry, speech difficulty, light-headedness, numbness and headaches.   Psychiatric/Behavioral:  Negative for agitation and confusion.        OBJECTIVE:     Vital Signs (Most Recent)              Physical Exam:  Physical Exam  Constitutional:       General: She is not in acute distress.     Appearance: Normal appearance. She is not toxic-appearing.   HENT:      Head: Normocephalic and atraumatic.      Right Ear: External ear normal.      Left Ear: External ear normal.      Mouth/Throat:      Mouth: Mucous membranes are moist.   Eyes:      General: No scleral icterus.     Conjunctiva/sclera: Conjunctivae normal.      Pupils: Pupils are equal, round, and reactive to light.   Cardiovascular:      Rate and Rhythm: Normal rate and regular rhythm.      Pulses: Normal pulses.   Pulmonary:      Effort: Pulmonary effort is normal. No respiratory distress.      Breath sounds: Normal breath sounds. No stridor. No wheezing or rhonchi.   Abdominal:      General: Abdomen is flat. There is no distension.      Palpations: Abdomen is soft. There is no mass.      Tenderness: There is no abdominal tenderness. There is no guarding or rebound.      Hernia: A hernia is present.   Musculoskeletal:         General: No swelling or tenderness. Normal range of motion.      Cervical back: Normal range of motion and neck supple.      Right lower leg: No edema.      Left lower leg: No edema.   Skin:     General: Skin is warm.      Capillary Refill: Capillary refill takes less than 2 seconds.       Coloration: Skin is not jaundiced or pale.      Findings: No erythema or rash.   Neurological:      General: No focal deficit present.      Mental Status: She is alert and oriented to person, place, and time.      Gait: Gait normal.   Psychiatric:         Mood and Affect: Mood normal.         Behavior: Behavior normal.         Judgment: Judgment normal.           ASSESSMENT/PLAN:     Small epigastric ventral hernia, recently was briefly symptomatic but this has resolved.    We discussed options including continued observation versus surgical repair.  At this point she is inclined to pursue observation which I think is perfectly reasonable.  She was instructed to call if further symptoms develop or if hernia enlarges significantly.

## 2024-01-02 ENCOUNTER — OFFICE VISIT (OUTPATIENT)
Dept: SURGICAL ONCOLOGY | Facility: CLINIC | Age: 84
End: 2024-01-02
Payer: MEDICARE

## 2024-01-02 VITALS
DIASTOLIC BLOOD PRESSURE: 73 MMHG | HEIGHT: 60 IN | BODY MASS INDEX: 34.16 KG/M2 | WEIGHT: 174 LBS | SYSTOLIC BLOOD PRESSURE: 111 MMHG

## 2024-01-02 DIAGNOSIS — K43.9 EPIGASTRIC HERNIA: ICD-10-CM

## 2024-01-02 PROCEDURE — 3078F DIAST BP <80 MM HG: CPT | Mod: CPTII,S$GLB,, | Performed by: SURGERY

## 2024-01-02 PROCEDURE — 99203 OFFICE O/P NEW LOW 30 MIN: CPT | Mod: S$GLB,,, | Performed by: SURGERY

## 2024-01-02 PROCEDURE — 1101F PT FALLS ASSESS-DOCD LE1/YR: CPT | Mod: CPTII,S$GLB,, | Performed by: SURGERY

## 2024-01-02 PROCEDURE — 1159F MED LIST DOCD IN RCRD: CPT | Mod: CPTII,S$GLB,, | Performed by: SURGERY

## 2024-01-02 PROCEDURE — 3074F SYST BP LT 130 MM HG: CPT | Mod: CPTII,S$GLB,, | Performed by: SURGERY

## 2024-01-02 PROCEDURE — 3288F FALL RISK ASSESSMENT DOCD: CPT | Mod: CPTII,S$GLB,, | Performed by: SURGERY

## 2024-01-02 PROCEDURE — 99999 PR PBB SHADOW E&M-EST. PATIENT-LVL III: CPT | Mod: PBBFAC,,, | Performed by: SURGERY

## 2024-01-18 ENCOUNTER — HOSPITAL ENCOUNTER (OUTPATIENT)
Dept: RADIOLOGY | Facility: HOSPITAL | Age: 84
Discharge: HOME | End: 2024-01-18
Attending: FAMILY MEDICINE
Payer: MEDICARE

## 2024-01-18 ENCOUNTER — OFFICE VISIT (OUTPATIENT)
Dept: FAMILY MEDICINE | Facility: CLINIC | Age: 84
End: 2024-01-18
Payer: MEDICARE

## 2024-01-18 VITALS
HEART RATE: 94 BPM | WEIGHT: 175 LBS | RESPIRATION RATE: 18 BRPM | OXYGEN SATURATION: 99 % | BODY MASS INDEX: 34.36 KG/M2 | DIASTOLIC BLOOD PRESSURE: 82 MMHG | HEIGHT: 60 IN | TEMPERATURE: 98 F | SYSTOLIC BLOOD PRESSURE: 130 MMHG

## 2024-01-18 DIAGNOSIS — E03.9 HYPOTHYROIDISM, UNSPECIFIED TYPE: Primary | ICD-10-CM

## 2024-01-18 DIAGNOSIS — H91.93 DECREASED HEARING OF BOTH EARS: ICD-10-CM

## 2024-01-18 DIAGNOSIS — M25.562 ACUTE PAIN OF LEFT KNEE: ICD-10-CM

## 2024-01-18 DIAGNOSIS — S02.2XXS CLOSED FRACTURE OF NASAL BONE, SEQUELA: ICD-10-CM

## 2024-01-18 PROCEDURE — 73562 X-RAY EXAM OF KNEE 3: CPT | Mod: TC,LT

## 2024-01-18 PROCEDURE — 99215 OFFICE O/P EST HI 40 MIN: CPT | Mod: PBBFAC | Performed by: FAMILY MEDICINE

## 2024-01-18 NOTE — PROGRESS NOTES
Subjective:       Patient ID: Piper Brown is a 83 y.o. female.    Chief Complaint: Follow-up and Arthritis (Unable to do water aerobics due to repairs. )    HPI    82 yo for follow up    Pt with fall and nasal bone fracture in Oct 2023. Pt still reports pain and continued nasal drainage. She does have seasonal allergies also but increased since injury.     She has problems with her hearing aid. States that she ordered it online and is not happy with them.    Complains of knee pain/knot on her knee since she fell in October.     Hypothyroidism- TSH April 2023 WNL. Compliant with medication     Has been unable to do water aerobics due to facility repairs    Upon leaving clinic, patient's daughter gave the nurse a note stating that they had concerns about her memory.     colonoscopy Dec 2021  Mammogram Nov 2023 BIRADS 1  DEXA 2021 normal bone density  Tdap 2018   Declines vaccination for shingles.  Declines flu vaccine  She thinks she had pneumonia vaccinations with Dr Aminata Quinteros     Review of Systems  As per HPI         Objective:      Vitals:    01/18/24 1017   BP: 130/82   Pulse: 94   Resp: 18   Temp: 97.5 °F (36.4 °C)       Physical Exam    General: pleasant, well developed, in no acute distress  Head: normocephalic, atraumatic  Skin: warm and dry  Heart: regular rate and rhythm, S1S2 normal, no murmurs, rubs, or gallops  Lungs: clear to auscultation bilaterally, no wheezes  Neurological: nonfocal, alert and oriented, cooperative with exam  Psych: judgement and insight good, though process logical, goal directed      Assessment/Plan:  Hypothyroidism, unspecified type  - continue medication and check labs at return visit    Decreased hearing of both ears  -     Ambulatory referral/consult to Audiology; Future; Expected date: 01/25/2024    Acute pain of left knee  -     X-Ray Knee 3 View Left; Future; Expected date: 01/18/2024    Closed fracture of nasal bone, sequela  - number for ENT given to patient to  schedule another appointment    Will discuss memory concerns at the follow up appointment.         Follow up in about 3 months (around 4/18/2024).

## 2024-01-23 ENCOUNTER — OFFICE VISIT (OUTPATIENT)
Dept: OTOLARYNGOLOGY | Facility: CLINIC | Age: 84
End: 2024-01-23
Payer: MEDICARE

## 2024-01-23 DIAGNOSIS — R09.81 NASAL CONGESTION: ICD-10-CM

## 2024-01-23 DIAGNOSIS — S02.2XXS CLOSED FRACTURE OF NASAL BONE, SEQUELA: Primary | ICD-10-CM

## 2024-01-23 DIAGNOSIS — J34.89 NASAL DRAINAGE: ICD-10-CM

## 2024-01-23 PROCEDURE — 99212 OFFICE O/P EST SF 10 MIN: CPT | Mod: PBBFAC | Performed by: STUDENT IN AN ORGANIZED HEALTH CARE EDUCATION/TRAINING PROGRAM

## 2024-01-23 RX ORDER — METHYLPREDNISOLONE 4 MG/1
TABLET ORAL
Qty: 21 EACH | Refills: 0 | Status: SHIPPED | OUTPATIENT
Start: 2024-01-23 | End: 2024-02-13

## 2024-01-23 NOTE — PROGRESS NOTES
[facility]  Otolaryngology Clinic Note    Piper BANERJEE Carrier  Encounter Date: 2024  YOB: 1940    Chief Complaint: Nasal bone fx     HPI: Piper BANERJEE Carrier is a 83 y.o. female who presents evaluation for nasal bone fracture.  She fell on 10/09.  Since then she has been following sinus precautions.  She denies epistaxis after the initial incident.  Patient denies any congestion or difficulty breathing.  Does not appreciate any nasal deformity, and she is not bothered by appearance.  She is not interested in surgery at this time.  She is not on any blood thinners.  Prior to nasal bone fractures she used nasal spray, Flonase daily      24: here because she has bump on left side of nose, darkening between eyes and persistent bump over R eye. Also having clear drainage from R side which just started 2 weeks ago, no salty metallic taste. Feels stopped up on R side, uses flonase occasionally.     ROS:   General: Negative except per HPI  Skin: Denies rash, ulcer, or lesion.  Eyes: Denies vision changes or diplopia.  Ears: Negative except per HPI  Nose: Negative except per HPI  Throat/mouth: Negative except per HPI  Cardiovascular: Negative except per HPI  Respiratory: Negative except per HPI  Neck: Negative except per HPI  Endocrine: Negative except per HPI  Neurologic: Negative except per HPI    Other 10-point review of systems negative except per HPI      Review of patient's allergies indicates:  No Known Allergies    No past medical history on file.    Past Surgical History:   Procedure Laterality Date    APPENDECTOMY  with childbirth     SECTION  two    HYSTERECTOMY  Partial after last child    TONSILLECTOMY  childhood years       Social History     Socioeconomic History    Marital status:    Tobacco Use    Smoking status: Former     Current packs/day: 0.50     Average packs/day: 0.5 packs/day for 10.0 years (5.0 ttl pk-yrs)     Types: Cigarettes      Passive exposure: Past    Smokeless tobacco: Never    Tobacco comments:     college years   Substance and Sexual Activity    Alcohol use: Never    Drug use: Never    Sexual activity: Not Currently     Birth control/protection: Abstinence     Comment: At 83 really?       Family History   Problem Relation Age of Onset    Diabetes Mother     Cancer Father        Outpatient Encounter Medications as of 1/23/2024   Medication Sig Dispense Refill    levothyroxine (SYNTHROID) 75 MCG tablet Take 1 tablet (75 mcg total) by mouth once daily. 90 tablet 3    bacitracin 500 unit/gram Oint Apply topically 2 (two) times daily. (Patient not taking: Reported on 1/18/2024) 30 g 0    fluticasone propionate (FLONASE) 50 mcg/actuation nasal spray 1 spray (50 mcg total) by Each Nostril route 2 (two) times a day. 16 g 11    NON FORMULARY MEDICATION Take 1 Package by mouth 2 (two) times a day. [facility]       No facility-administered encounter medications on file as of 1/23/2024.       Physical Exam:  There were no vitals filed for this visit.      Constitutional  General Appearance: well nourished, well-developed, AAO x3, NAD  HEENT:  mild soft tissue edema over R supraorbital rim, no palpable step offs, no hematoma   Eyes: PEERLA, EOMI, normal conjunctivae, mild darkening between nasal root  Ears: Hearing well at conversation level; anguiano - no lateralization, Rinne AC > BC   AD: auricle normal, EAC normal, TM intact, no JONATHAN   AS: auricle normal, EAC normal, TM intact, somewhat atelectatic with myringosclerosis   Vestibular: ambulates without gait disturbance  Nose: septum midline, no inferior turbinate hypertrophy, no polyps, large L step off   OC/OP: MMM  Nasopharynx, Hypopharynx, and Larynx:    Indirect: attempted, limited view due to patient intolerance  Neck: soft, non-tender, no palpable lymph nodes   Thyroid region- no nodules or goiter  Neuro: CN II - XII intact bilaterally  Cardiovascular: peripheral pulses  palpable  Respiratory: non-labored respirations  Psychiatric: oriented to time, place and person, no depression, anxiety or agitation      Pertinent Data:  ? LABS:  ? AUDIO:  ? CT Scan:    Imaging:   I personally reviewed the following images:    Summary of Outside Records:      Assessment/Plan:  Piper Brown is a 83 y.o. female who presents with nasal bone fracture. Palpable step off on L, nasal drainage on R, review of imaging no frontal sinus fx, could have scarring vs just inflammation from healing. Does not want to try abx but ok to trying oral steroids. Discussed if she did want nasal fx fixed, would need to wait 9 months and would require OSRP, patient not interested     -medrol dose russell, r/b/a discussed, stop if any unwanted symptoms   -flonase 2 sprays BID  - Rtc 6-8 weeks     Sara Sandy MD  [facility] Department of Otolaryngology  HO-V        Answers submitted by the patient for this visit:  Review of Symptoms Questionnaire  (Submitted on 10/23/2023)  None of these: Yes  hearing loss: Yes  facial swelling: Yes  None of these : Yes  shortness of breath: Yes  None of these : Yes  None of these: Yes  Urinating too frequently?: Yes  Muscle aches / pain?: Yes  None of these : Yes  Seasonal Allergies?: Yes  None of these : Yes  headaches: Yes  None of these: Yes  None of these: Yes

## 2024-01-31 ENCOUNTER — TELEPHONE (OUTPATIENT)
Dept: AUDIOLOGY | Facility: HOSPITAL | Age: 84
End: 2024-01-31
Payer: MEDICARE

## 2024-01-31 NOTE — TELEPHONE ENCOUNTER
Patient has cancelled her Audiology appointment scheduled today via SMS text. Attempted to reschedule appointment , but patient stated that her insurance is not going to cover the hearing test, and therefore she does not wish to reschedule.

## 2024-02-14 NOTE — PROGRESS NOTES
I saw and evaluated the patient and was present for the key portions of the exam and separately billed procedures, if any.  I have reviewed and agree with the resident's findings, including all diagnostic interpretations and plans as written.     Felice Dunn M.D.

## 2024-02-15 NOTE — TELEPHONE ENCOUNTER
Pt stated she was not out of the medication at this time. Just needing a refill because she was notified she needed to ask for one.

## 2024-02-20 RX ORDER — FLUTICASONE PROPIONATE 50 MCG
1 SPRAY, SUSPENSION (ML) NASAL 2 TIMES DAILY
Qty: 16 G | Refills: 11 | Status: SHIPPED | OUTPATIENT
Start: 2024-02-20 | End: 2024-04-18

## 2024-03-19 ENCOUNTER — OFFICE VISIT (OUTPATIENT)
Dept: OTOLARYNGOLOGY | Facility: CLINIC | Age: 84
End: 2024-03-19
Payer: MEDICARE

## 2024-03-19 VITALS
TEMPERATURE: 98 F | BODY MASS INDEX: 33.5 KG/M2 | HEART RATE: 89 BPM | DIASTOLIC BLOOD PRESSURE: 86 MMHG | OXYGEN SATURATION: 97 % | SYSTOLIC BLOOD PRESSURE: 136 MMHG | HEIGHT: 60 IN | WEIGHT: 170.63 LBS

## 2024-03-19 DIAGNOSIS — S02.2XXS CLOSED FRACTURE OF NASAL BONE, SEQUELA: Primary | ICD-10-CM

## 2024-03-19 PROCEDURE — 99213 OFFICE O/P EST LOW 20 MIN: CPT | Mod: PBBFAC | Performed by: STUDENT IN AN ORGANIZED HEALTH CARE EDUCATION/TRAINING PROGRAM

## 2024-03-19 NOTE — PROGRESS NOTES
[facility]  Otolaryngology Clinic Note    Piper BANERJEE Carrier  Encounter Date: 3/19/2024  YOB: 1940    Chief Complaint: Nasal bone fx     HPI: Piper BANERJEE Carrier is a 83 y.o. female who presents evaluation for nasal bone fracture.  She fell on 10/09.  Since then she has been following sinus precautions.  She denies epistaxis after the initial incident.  Patient denies any congestion or difficulty breathing.  Does not appreciate any nasal deformity, and she is not bothered by appearance.  She is not interested in surgery at this time.  She is not on any blood thinners.  Prior to nasal bone fractures she used nasal spray, Flonase daily      24: here because she has bump on left side of nose, darkening between eyes and persistent bump over R eye. Also having clear drainage from R side which just started 2 weeks ago, no salty metallic taste. Feels stopped up on R side, uses flonase occasionally.     3/19/24: doing better improved drainage, only did 1 day of steroids    ROS:   General: Negative except per HPI  Skin: Denies rash, ulcer, or lesion.  Eyes: Denies vision changes or diplopia.  Ears: Negative except per HPI  Nose: Negative except per HPI  Throat/mouth: Negative except per HPI  Cardiovascular: Negative except per HPI  Respiratory: Negative except per HPI  Neck: Negative except per HPI  Endocrine: Negative except per HPI  Neurologic: Negative except per HPI    Other 10-point review of systems negative except per HPI      Review of patient's allergies indicates:  No Known Allergies    No past medical history on file.    Past Surgical History:   Procedure Laterality Date    APPENDECTOMY  with childbirth     SECTION  two    HYSTERECTOMY  Partial after last child    TONSILLECTOMY  childhood years       Social History     Socioeconomic History    Marital status:    Tobacco Use    Smoking status: Former     Current packs/day: 0.50     Average packs/day: 0.5  packs/day for 10.0 years (5.0 ttl pk-yrs)     Types: Cigarettes     Passive exposure: Past    Smokeless tobacco: Never    Tobacco comments:     college years   Substance and Sexual Activity    Alcohol use: Never    Drug use: Never    Sexual activity: Not Currently     Birth control/protection: Abstinence     Comment: At 83 really?       Family History   Problem Relation Age of Onset    Diabetes Mother     Cancer Father        Outpatient Encounter Medications as of 3/19/2024   Medication Sig Dispense Refill    fluticasone propionate (FLONASE) 50 mcg/actuation nasal spray 1 spray (50 mcg total) by Each Nostril route 2 (two) times a day. 16 g 11    levothyroxine (SYNTHROID) 75 MCG tablet Take 1 tablet (75 mcg total) by mouth once daily. 90 tablet 3    NON FORMULARY MEDICATION Take 1 Package by mouth 2 (two) times a day. [facility]      bacitracin 500 unit/gram Oint Apply topically 2 (two) times daily. (Patient not taking: Reported on 1/18/2024) 30 g 0     No facility-administered encounter medications on file as of 3/19/2024.       Physical Exam:  Vitals:    03/19/24 1258   BP: 136/86   BP Location: Right arm   Patient Position: Sitting   BP Method: Medium (Automatic)   Pulse: 89   Temp: 97.5 °F (36.4 °C)   TempSrc: Oral   SpO2: 97%   Weight: 77.4 kg (170 lb 9.6 oz)   Height: 5' (1.524 m)         Constitutional  General Appearance: well nourished, well-developed, AAO x3, NAD  HEENT:  mild soft tissue edema over R supraorbital rim, no palpable step offs, no hematoma   Eyes: PEERLA, EOMI, normal conjunctivae, mild darkening between nasal root  Ears: Hearing well at conversation level; anguiano - no lateralization, Rinne AC > BC   AD: auricle normal, EAC normal, TM intact, no JONATHAN   AS: auricle normal, EAC normal, TM intact, somewhat atelectatic with myringosclerosis   Vestibular: ambulates without gait disturbance  Nose: septum midline, no inferior turbinate hypertrophy, no polyps, large L step off    OC/OP: MMM  Nasopharynx, Hypopharynx, and Larynx:    Indirect: attempted, limited view due to patient intolerance  Neck: soft, non-tender, no palpable lymph nodes   Thyroid region- no nodules or goiter  Neuro: CN II - XII intact bilaterally  Cardiovascular: peripheral pulses palpable  Respiratory: non-labored respirations  Psychiatric: oriented to time, place and person, no depression, anxiety or agitation      Pertinent Data:  ? LABS:  ? AUDIO:  ? CT Scan:    Imaging:   I personally reviewed the following images:    Summary of Outside Records:      Assessment/Plan:  Piper Brown is a 83 y.o. female who presents with nasal bone fracture. Palpable step off on L, nasal drainage on R, review of imaging no frontal sinus fx, could have scarring vs just inflammation from healing. Improved drainage, still with darkening skin likely from hemosiderin deposits after significant bruising. Recommend face roller for inflammation    RTC PRDEL Sandy MD  [facility] Department of Otolaryngology  HO-V        Answers submitted by the patient for this visit:  Review of Symptoms Questionnaire  (Submitted on 10/23/2023)  None of these: Yes  hearing loss: Yes  facial swelling: Yes  None of these : Yes  shortness of breath: Yes  None of these : Yes  None of these: Yes  Urinating too frequently?: Yes  Muscle aches / pain?: Yes  None of these : Yes  Seasonal Allergies?: Yes  None of these : Yes  headaches: Yes  None of these: Yes  None of these: Yes

## 2024-04-08 DIAGNOSIS — H91.90 HEARING LOSS, UNSPECIFIED HEARING LOSS TYPE, UNSPECIFIED LATERALITY: Primary | ICD-10-CM

## 2024-04-09 RX ORDER — LEVOTHYROXINE SODIUM 75 UG/1
75 TABLET ORAL DAILY
Qty: 90 TABLET | Refills: 3 | Status: SHIPPED | OUTPATIENT
Start: 2024-04-09

## 2024-04-09 NOTE — PROGRESS NOTES
I have reviewed and agree with the resident's findings, including all diagnostic interpretations and plans as written.     Felice Dunn M.D.

## 2024-04-18 ENCOUNTER — CLINICAL SUPPORT (OUTPATIENT)
Dept: AUDIOLOGY | Facility: HOSPITAL | Age: 84
End: 2024-04-18
Payer: MEDICARE

## 2024-04-18 ENCOUNTER — OFFICE VISIT (OUTPATIENT)
Dept: FAMILY MEDICINE | Facility: CLINIC | Age: 84
End: 2024-04-18
Payer: MEDICARE

## 2024-04-18 VITALS
TEMPERATURE: 98 F | HEIGHT: 60 IN | OXYGEN SATURATION: 97 % | HEART RATE: 94 BPM | SYSTOLIC BLOOD PRESSURE: 141 MMHG | DIASTOLIC BLOOD PRESSURE: 88 MMHG | WEIGHT: 170 LBS | BODY MASS INDEX: 33.38 KG/M2 | RESPIRATION RATE: 18 BRPM

## 2024-04-18 DIAGNOSIS — H91.90 HEARING LOSS, UNSPECIFIED HEARING LOSS TYPE, UNSPECIFIED LATERALITY: ICD-10-CM

## 2024-04-18 DIAGNOSIS — R41.3 MEMORY CHANGE: ICD-10-CM

## 2024-04-18 DIAGNOSIS — E03.9 HYPOTHYROIDISM, UNSPECIFIED TYPE: Primary | ICD-10-CM

## 2024-04-18 DIAGNOSIS — J30.2 SEASONAL ALLERGIES: ICD-10-CM

## 2024-04-18 DIAGNOSIS — H91.93 DECREASED HEARING OF BOTH EARS: ICD-10-CM

## 2024-04-18 DIAGNOSIS — H90.3 SENSORINEURAL HEARING LOSS, BILATERAL: Primary | ICD-10-CM

## 2024-04-18 LAB
BASOPHILS # BLD AUTO: 0.04 X10(3)/MCL
BASOPHILS NFR BLD AUTO: 0.9 %
EOSINOPHIL # BLD AUTO: 0.07 X10(3)/MCL (ref 0–0.9)
EOSINOPHIL NFR BLD AUTO: 1.6 %
ERYTHROCYTE [DISTWIDTH] IN BLOOD BY AUTOMATED COUNT: 13 % (ref 11.5–17)
FOLATE SERPL-MCNC: 15 NG/ML (ref 7–31.4)
HCT VFR BLD AUTO: 40.3 % (ref 37–47)
HGB BLD-MCNC: 13.6 G/DL (ref 12–16)
IMM GRANULOCYTES # BLD AUTO: 0.01 X10(3)/MCL (ref 0–0.04)
IMM GRANULOCYTES NFR BLD AUTO: 0.2 %
LYMPHOCYTES # BLD AUTO: 0.85 X10(3)/MCL (ref 0.6–4.6)
LYMPHOCYTES NFR BLD AUTO: 19.6 %
MCH RBC QN AUTO: 29.7 PG (ref 27–31)
MCHC RBC AUTO-ENTMCNC: 33.7 G/DL (ref 33–36)
MCV RBC AUTO: 88 FL (ref 80–94)
MONOCYTES # BLD AUTO: 0.32 X10(3)/MCL (ref 0.1–1.3)
MONOCYTES NFR BLD AUTO: 7.4 %
NEUTROPHILS # BLD AUTO: 3.05 X10(3)/MCL (ref 2.1–9.2)
NEUTROPHILS NFR BLD AUTO: 70.3 %
NRBC BLD AUTO-RTO: 0 %
PLATELET # BLD AUTO: 207 X10(3)/MCL (ref 130–400)
PMV BLD AUTO: 10.2 FL (ref 7.4–10.4)
RBC # BLD AUTO: 4.58 X10(6)/MCL (ref 4.2–5.4)
TSH SERPL-ACNC: 1.36 UIU/ML (ref 0.35–4.94)
VIT B12 SERPL-MCNC: 361 PG/ML (ref 213–816)
WBC # SPEC AUTO: 4.34 X10(3)/MCL (ref 4.5–11.5)

## 2024-04-18 PROCEDURE — 92557 COMPREHENSIVE HEARING TEST: CPT | Performed by: AUDIOLOGIST

## 2024-04-18 PROCEDURE — 36415 COLL VENOUS BLD VENIPUNCTURE: CPT | Performed by: FAMILY MEDICINE

## 2024-04-18 PROCEDURE — 99213 OFFICE O/P EST LOW 20 MIN: CPT | Mod: PBBFAC,25 | Performed by: FAMILY MEDICINE

## 2024-04-18 PROCEDURE — 92567 TYMPANOMETRY: CPT | Performed by: AUDIOLOGIST

## 2024-04-18 PROCEDURE — 85025 COMPLETE CBC W/AUTO DIFF WBC: CPT | Performed by: FAMILY MEDICINE

## 2024-04-18 PROCEDURE — 84443 ASSAY THYROID STIM HORMONE: CPT | Performed by: FAMILY MEDICINE

## 2024-04-18 PROCEDURE — 82607 VITAMIN B-12: CPT | Performed by: FAMILY MEDICINE

## 2024-04-18 PROCEDURE — 82746 ASSAY OF FOLIC ACID SERUM: CPT | Performed by: FAMILY MEDICINE

## 2024-04-18 NOTE — PROGRESS NOTES
Subjective:       Patient ID: Piper Brown is a 83 y.o. female.    Chief Complaint: Follow-up and Hypothyroidism    HPI  84 yo for follow up    Pt had audiology appointment this AM and has a form to obtain hearing aids    Hypothyroidism- compliant with medication daily    Seasonal allergies- has flonase/claritin but does not take daily.     Upon leaving clinic at the last appointment, the patient's daughter gave the nurse a note stating that they had concerns about her memory. Discussed memory today. Pt reports forgetting a pot of boiling eggs once but now takes a timer with her around the house to alert her as to when the eggs are complete. She lives alone and cleans her own home. She manages her own finances and cooks all of her meals. She does drive but states that she does not like to drive in traffic. Denies getting lost or getting into any wrecks. Does have occasional trouble finding words. Pt agrees to labs and SLUMS today.     colonoscopy Dec 2021  Mammogram Nov 2023 BIRADS 1  DEXA 2021 normal bone density  Tdap 2018   Declines vaccination for shingles.  Declines flu vaccine  She thinks she had pneumonia vaccinations with Dr Aminata Quinteros    Review of Systems  As per HPI         Objective:      Vitals:    04/18/24 0858   BP: (!) 141/88   Pulse: 94   Resp: 18   Temp: 97.7 °F (36.5 °C)       Physical Exam    General: pleasant, well developed, in no acute distress  Head: normocephalic, atraumatic  Skin: warm and dry  Heart: regular rate and rhythm, S1S2 normal, no murmurs, rubs, or gallops  Lungs: clear to auscultation bilaterally, no wheezes  Neurological: nonfocal, alert and oriented, cooperative with exam  Psych: judgement and insight good, though process logical, goal directed      Assessment/Plan:  Hypothyroidism, unspecified type  -     CBC Auto Differential; Future; Expected date: 04/18/2024  -     TSH; Future; Expected date: 04/18/2024  -     Vitamin B12; Future; Expected date: 04/18/2024  -      Folate; Future; Expected date: 04/18/2024  - adjust medication as needed after labs    Decreased hearing of both ears  - encouraged patient to obtain hearing aids     Seasonal allergies  -  claritin and flonase    Memory change  -     CBC Auto Differential; Future; Expected date: 04/18/2024  -     TSH; Future; Expected date: 04/18/2024  -     Vitamin B12; Future; Expected date: 04/18/2024  -     Folate; Future; Expected date: 04/18/2024  - DOMINIQUE completed in clinic today (27/30)       Follow up in about 6 months (around 10/18/2024).

## 2024-04-18 NOTE — PROGRESS NOTES
Hearing Evaluation        Patient History: Ms. Stephanie reports a long-standing hearing loss, onset years ago. She also reports constant bilateral tinnitus. Vertigo and middle ear issues are denied. All additional history is unremarkable.        Test Results:                    Pure Tone Testing:      Right ear:       Moderate to severe sensorineural hearing loss from 250-8kHz. Speech reception threshold is in agreement with puretone findings.  Discrimination score of 80% is considered good.        Left ear:          Moderate to severe sensorineural hearing loss from 250-8kHz. Speech reception threshold is in agreement with puretone findings.  Discrimination score of 72% is considered good.                                                                            Tympanometry:                                           Right ear:       Type 'Ad' tymp, hypercompliant (3.32mL) mobility     Left ear:          Type 'Ad' tymp, hypercompliant (1.96mL) mobility              Interpretations:      Behavioral test findings indicate a moderate to severe SNHL, bilaterally. Speech reception thresholds obtained at 55dB, AD and 60dB, AS, and are in agreement with puretone findings. Speech discrimination scores of 80%, AD and 72%, AS, are considered good.  Immittance measures indicate hypercompliant TM mobility, bilaterally.            Recommendations:   1. Annual hearing evaluations  2. Binaural amplification (Information on [place] [employer] given)

## 2024-06-19 ENCOUNTER — TELEPHONE (OUTPATIENT)
Dept: FAMILY MEDICINE | Facility: CLINIC | Age: 84
End: 2024-06-19
Payer: MEDICARE

## 2024-06-19 NOTE — TELEPHONE ENCOUNTER
Pt left 2 messages c/o of left hand trigger finger and also stated she thinks she is starting to have the beginnings of dementia and would like to be seen. Please advise.

## 2024-06-20 ENCOUNTER — OFFICE VISIT (OUTPATIENT)
Dept: FAMILY MEDICINE | Facility: CLINIC | Age: 84
End: 2024-06-20
Payer: MEDICARE

## 2024-06-20 VITALS
OXYGEN SATURATION: 98 % | HEIGHT: 60 IN | SYSTOLIC BLOOD PRESSURE: 139 MMHG | TEMPERATURE: 98 F | DIASTOLIC BLOOD PRESSURE: 87 MMHG | BODY MASS INDEX: 34.11 KG/M2 | WEIGHT: 173.75 LBS | RESPIRATION RATE: 18 BRPM | HEART RATE: 89 BPM

## 2024-06-20 DIAGNOSIS — R41.3 MEMORY CHANGE: ICD-10-CM

## 2024-06-20 DIAGNOSIS — M65.30 TRIGGER FINGER OF LEFT HAND, UNSPECIFIED FINGER: Primary | ICD-10-CM

## 2024-06-20 DIAGNOSIS — H91.93 DECREASED HEARING OF BOTH EARS: ICD-10-CM

## 2024-06-20 PROCEDURE — 99214 OFFICE O/P EST MOD 30 MIN: CPT | Mod: PBBFAC | Performed by: FAMILY MEDICINE

## 2024-06-20 NOTE — PROGRESS NOTES
Subjective:       Patient ID: Piper Brown is a 83 y.o. female.    Chief Complaint: left trigger finger  and Memory Loss    HPI  82 yo for concern of left trigger finger and also memory loss.     Pt reports occasional pain and locking of finger in left hand. No previous trigger finger treatment. She denies any pain today.     At the last visit, we discussed memory concerns with patient and her daughter. Patient is here today without family member but states that she only has difficulty occasionally with word finding. She lives alone and is independent with ADL and IADL. She cooks, takes care of finances, cleans her home, mows lawn, drives and no problem getting lost. She has gotten hearing aids and states that she is doing well with them and thinks that some of the concerns with memory were actually due to her hearing. She denies a FH of dementia.        Review of Systems  As per HPI         Objective:      Vitals:    06/20/24 0952   BP: 139/87   Pulse: 89   Resp: 18   Temp: 97.9 °F (36.6 °C)       Physical Exam    General: pleasant, well developed, in no acute distress  Head: normocephalic, atraumatic  Skin: warm and dry  Heart: regular rate and rhythm, S1S2 normal, no murmurs, rubs, or gallops  Neurological: nonfocal, alert and oriented, cooperative with exam  Psych: judgement and insight good, though process logical, goal directed        Assessment/Plan:  Trigger finger of left hand, unspecified finger  - will send referral    Decreased hearing of both ears  - continue hearing aids    Memory change  - reviewed previous imaging and labs with the patient  - discussed consideration of alpha lipoic acid  - possible MRI in the future  - TEOFILOUMS completed at last visit (27/30)       Follow up for keep previously scheduled October appointment.

## 2024-09-04 ENCOUNTER — TELEPHONE (OUTPATIENT)
Dept: FAMILY MEDICINE | Facility: CLINIC | Age: 84
End: 2024-09-04
Payer: MEDICARE

## 2024-09-04 RX ORDER — CYANOCOBALAMIN (VITAMIN B-12) 500 MCG
1 TABLET ORAL NIGHTLY
Qty: 90 TABLET | Refills: 1 | Status: SHIPPED | OUTPATIENT
Start: 2024-09-04

## 2024-09-04 NOTE — TELEPHONE ENCOUNTER
"----- Message from Ladonna Holly RN sent at 9/4/2024  9:02 AM CDT -----  Regarding: Melatonin  Pt left voicemail on 8/30/24. Pt states 5mg of Melatonin that she is taking "knocks her out." She is requesting a 1mg dosage instead (as recommended by friend), to be sent to [facility].  Call back number left: [phone].  "

## 2024-10-03 ENCOUNTER — OFFICE VISIT (OUTPATIENT)
Dept: FAMILY MEDICINE | Facility: CLINIC | Age: 84
End: 2024-10-03
Payer: MEDICARE

## 2024-10-03 VITALS
RESPIRATION RATE: 18 BRPM | OXYGEN SATURATION: 97 % | TEMPERATURE: 98 F | SYSTOLIC BLOOD PRESSURE: 144 MMHG | HEART RATE: 89 BPM | WEIGHT: 170.88 LBS | BODY MASS INDEX: 33.55 KG/M2 | DIASTOLIC BLOOD PRESSURE: 88 MMHG | HEIGHT: 60 IN

## 2024-10-03 DIAGNOSIS — Z12.31 VISIT FOR SCREENING MAMMOGRAM: ICD-10-CM

## 2024-10-03 DIAGNOSIS — Z01.818 PRE-OP EVALUATION: Primary | ICD-10-CM

## 2024-10-03 DIAGNOSIS — E03.9 HYPOTHYROIDISM, UNSPECIFIED TYPE: ICD-10-CM

## 2024-10-03 DIAGNOSIS — M65.30 TRIGGER FINGER OF LEFT HAND, UNSPECIFIED FINGER: ICD-10-CM

## 2024-10-03 LAB
ANION GAP SERPL CALC-SCNC: 9 MEQ/L
BASOPHILS # BLD AUTO: 0.03 X10(3)/MCL
BASOPHILS NFR BLD AUTO: 0.7 %
BUN SERPL-MCNC: 13.9 MG/DL (ref 9.8–20.1)
CALCIUM SERPL-MCNC: 9.9 MG/DL (ref 8.4–10.2)
CHLORIDE SERPL-SCNC: 107 MMOL/L (ref 98–107)
CO2 SERPL-SCNC: 24 MMOL/L (ref 23–31)
CREAT SERPL-MCNC: 0.79 MG/DL (ref 0.55–1.02)
CREAT/UREA NIT SERPL: 18
EOSINOPHIL # BLD AUTO: 0.11 X10(3)/MCL (ref 0–0.9)
EOSINOPHIL NFR BLD AUTO: 2.4 %
ERYTHROCYTE [DISTWIDTH] IN BLOOD BY AUTOMATED COUNT: 13.1 % (ref 11.5–17)
GFR SERPLBLD CREATININE-BSD FMLA CKD-EPI: >60 ML/MIN/1.73/M2
GLUCOSE SERPL-MCNC: 89 MG/DL (ref 82–115)
HCT VFR BLD AUTO: 38.9 % (ref 37–47)
HGB BLD-MCNC: 13.1 G/DL (ref 12–16)
IMM GRANULOCYTES # BLD AUTO: 0.01 X10(3)/MCL (ref 0–0.04)
IMM GRANULOCYTES NFR BLD AUTO: 0.2 %
LYMPHOCYTES # BLD AUTO: 0.85 X10(3)/MCL (ref 0.6–4.6)
LYMPHOCYTES NFR BLD AUTO: 18.9 %
MCH RBC QN AUTO: 29.8 PG (ref 27–31)
MCHC RBC AUTO-ENTMCNC: 33.7 G/DL (ref 33–36)
MCV RBC AUTO: 88.4 FL (ref 80–94)
MONOCYTES # BLD AUTO: 0.46 X10(3)/MCL (ref 0.1–1.3)
MONOCYTES NFR BLD AUTO: 10.2 %
NEUTROPHILS # BLD AUTO: 3.04 X10(3)/MCL (ref 2.1–9.2)
NEUTROPHILS NFR BLD AUTO: 67.6 %
NRBC BLD AUTO-RTO: 0 %
PLATELET # BLD AUTO: 193 X10(3)/MCL (ref 130–400)
PMV BLD AUTO: 10.6 FL (ref 7.4–10.4)
POTASSIUM SERPL-SCNC: 4.4 MMOL/L (ref 3.5–5.1)
RBC # BLD AUTO: 4.4 X10(6)/MCL (ref 4.2–5.4)
SODIUM SERPL-SCNC: 140 MMOL/L (ref 136–145)
WBC # BLD AUTO: 4.5 X10(3)/MCL (ref 4.5–11.5)

## 2024-10-03 PROCEDURE — 36415 COLL VENOUS BLD VENIPUNCTURE: CPT | Performed by: FAMILY MEDICINE

## 2024-10-03 PROCEDURE — 1101F PT FALLS ASSESS-DOCD LE1/YR: CPT | Mod: CPTII,,, | Performed by: FAMILY MEDICINE

## 2024-10-03 PROCEDURE — 99214 OFFICE O/P EST MOD 30 MIN: CPT | Mod: PBBFAC | Performed by: FAMILY MEDICINE

## 2024-10-03 PROCEDURE — 3079F DIAST BP 80-89 MM HG: CPT | Mod: CPTII,,, | Performed by: FAMILY MEDICINE

## 2024-10-03 PROCEDURE — 1160F RVW MEDS BY RX/DR IN RCRD: CPT | Mod: CPTII,,, | Performed by: FAMILY MEDICINE

## 2024-10-03 PROCEDURE — 80048 BASIC METABOLIC PNL TOTAL CA: CPT | Performed by: FAMILY MEDICINE

## 2024-10-03 PROCEDURE — 99213 OFFICE O/P EST LOW 20 MIN: CPT | Mod: S$PBB,,, | Performed by: FAMILY MEDICINE

## 2024-10-03 PROCEDURE — 3077F SYST BP >= 140 MM HG: CPT | Mod: CPTII,,, | Performed by: FAMILY MEDICINE

## 2024-10-03 PROCEDURE — 1159F MED LIST DOCD IN RCRD: CPT | Mod: CPTII,,, | Performed by: FAMILY MEDICINE

## 2024-10-03 PROCEDURE — 3288F FALL RISK ASSESSMENT DOCD: CPT | Mod: CPTII,,, | Performed by: FAMILY MEDICINE

## 2024-10-03 PROCEDURE — 85025 COMPLETE CBC W/AUTO DIFF WBC: CPT | Performed by: FAMILY MEDICINE

## 2024-10-03 NOTE — PROGRESS NOTES
Subjective:       Patient ID: Piper Brown is a 84 y.o. female.    Chief Complaint: Requesting lab work for upcoming procedure    HPI  85 yo here requesting labs (CBC and BMP) prior to trigger finger release next week. Pt reports feeling well and compliant with levothyroxine. No concerns today. She is feeling well.     Review of Systems  As per HPI         Objective:      Vitals:    10/03/24 0904   BP: (!) 144/88   Pulse: 89   Resp: 18   Temp: 97.5 °F (36.4 °C)       Physical Exam    General: pleasant, well developed, in no acute distress  Head: normocephalic, atraumatic  Skin: warm and dry  Heart: regular rate and rhythm, S1S2 normal, no murmurs, rubs, or gallops  Lungs: clear to auscultation bilaterally, no wheezes  Neurological: nonfocal, alert and oriented, cooperative with exam  Psych: judgement and insight good, though process logical, goal directed      Assessment/Plan:  Pre-op evaluation  -     CBC Auto Differential; Future; Expected date: 10/03/2024  -     Basic Metabolic Panel; Future; Expected date: 10/03/2024    Hypothyroidism, unspecified type  -     CBC Auto Differential; Future; Expected date: 10/03/2024  -     Basic Metabolic Panel; Future; Expected date: 10/03/2024    Trigger finger of left hand, unspecified finger  - surgery planned next week    Visit for screening mammogram  -     Mammo Digital Screening Bilat w/ Vegeny; Future; Expected date: 11/03/2025         Follow up in about 6 months (around 4/3/2025).

## 2024-10-15 ENCOUNTER — PATIENT MESSAGE (OUTPATIENT)
Dept: RESEARCH | Facility: HOSPITAL | Age: 84
End: 2024-10-15
Payer: MEDICARE

## 2024-12-03 ENCOUNTER — HOSPITAL ENCOUNTER (OUTPATIENT)
Dept: RADIOLOGY | Facility: HOSPITAL | Age: 84
Discharge: HOME | End: 2024-12-03
Attending: FAMILY MEDICINE
Payer: MEDICARE

## 2024-12-03 DIAGNOSIS — Z12.31 VISIT FOR SCREENING MAMMOGRAM: ICD-10-CM

## 2024-12-03 PROCEDURE — 77063 BREAST TOMOSYNTHESIS BI: CPT | Mod: TC

## 2024-12-03 PROCEDURE — 77067 SCR MAMMO BI INCL CAD: CPT | Mod: 26,,, | Performed by: RADIOLOGY

## 2024-12-03 PROCEDURE — 77063 BREAST TOMOSYNTHESIS BI: CPT | Mod: 26,,, | Performed by: RADIOLOGY

## 2024-12-17 RX ORDER — FLUTICASONE PROPIONATE 50 MCG
SPRAY, SUSPENSION (ML) NASAL
COMMUNITY
Start: 2024-09-30 | End: 2024-12-17 | Stop reason: SDUPTHER

## 2024-12-17 RX ORDER — FLUTICASONE PROPIONATE 50 MCG
1 SPRAY, SUSPENSION (ML) NASAL DAILY
Qty: 16 G | Refills: 11 | Status: SHIPPED | OUTPATIENT
Start: 2024-12-17

## 2025-01-27 DIAGNOSIS — E03.9 HYPOTHYROIDISM, UNSPECIFIED TYPE: Primary | ICD-10-CM

## 2025-01-28 RX ORDER — LEVOTHYROXINE SODIUM 75 UG/1
75 TABLET ORAL DAILY
Qty: 90 TABLET | Refills: 3 | Status: SHIPPED | OUTPATIENT
Start: 2025-01-28

## 2025-04-03 ENCOUNTER — OFFICE VISIT (OUTPATIENT)
Dept: FAMILY MEDICINE | Facility: CLINIC | Age: 85
End: 2025-04-03
Payer: MEDICARE

## 2025-04-03 VITALS
TEMPERATURE: 98 F | HEIGHT: 60 IN | BODY MASS INDEX: 33.22 KG/M2 | OXYGEN SATURATION: 95 % | HEART RATE: 100 BPM | WEIGHT: 169.19 LBS | DIASTOLIC BLOOD PRESSURE: 72 MMHG | SYSTOLIC BLOOD PRESSURE: 136 MMHG

## 2025-04-03 DIAGNOSIS — E03.9 HYPOTHYROIDISM, UNSPECIFIED TYPE: Primary | ICD-10-CM

## 2025-04-03 DIAGNOSIS — J30.2 SEASONAL ALLERGIES: ICD-10-CM

## 2025-04-03 LAB — TSH SERPL-ACNC: 1.17 UIU/ML (ref 0.35–4.94)

## 2025-04-03 PROCEDURE — 84443 ASSAY THYROID STIM HORMONE: CPT | Performed by: FAMILY MEDICINE

## 2025-04-03 PROCEDURE — 99213 OFFICE O/P EST LOW 20 MIN: CPT | Mod: PBBFAC | Performed by: FAMILY MEDICINE

## 2025-04-03 PROCEDURE — 36415 COLL VENOUS BLD VENIPUNCTURE: CPT | Performed by: FAMILY MEDICINE

## 2025-04-03 NOTE — PROGRESS NOTES
Subjective:       Patient ID: Piper Brown is a 84 y.o. female.    Chief Complaint: Follow-up (Wellness check up)    HPI  85 yo for follow up. No complaints or concerns today    Hypothyroidism- compliant with medication. No complaints.     Had trigger finger release since last visit. Still some bend with finger. Does stretching at home. Not bothersome to the patient at this time and she does not desire further treatment.     Has restarted water aerobics 3x per week. Has noticed improvement in strength.     Family previously had concerns over memory. Pt now has hearing aids. Family today denies any concerns.       colonoscopy Dec 2021  Mammogram Dec 2024  DEXA 2021 normal bone density  Tdap 2018   Declines vaccinations    Review of Systems  As per HPI         Objective:      Vitals:    04/03/25 1024   BP: 136/72   Pulse: 100   Temp: 97.5 °F (36.4 °C)       Physical Exam    General: pleasant, well developed, in no acute distress  Head: normocephalic, atraumatic  Skin: warm and dry  Heart: regular rate and rhythm, S1S2 normal, no murmurs, rubs, or gallops  Lungs: clear to auscultation bilaterally, no wheezes  Extremities: no edema  Neurological: nonfocal, alert and oriented, cooperative with exam  Psych: judgement and insight good, though process logical, goal directed      Assessment/Plan:  Hypothyroidism, unspecified type  -     TSH; Future; Expected date: 04/03/2025  - labs today; adjust meds as needed after results    Seasonal allergies  - flonase and claritin prn       Follow up in about 6 months (around 10/3/2025).

## 2025-07-31 DIAGNOSIS — R32 URINARY INCONTINENCE, UNSPECIFIED TYPE: Primary | ICD-10-CM
